# Patient Record
Sex: FEMALE | Race: BLACK OR AFRICAN AMERICAN | Employment: FULL TIME | URBAN - METROPOLITAN AREA
[De-identification: names, ages, dates, MRNs, and addresses within clinical notes are randomized per-mention and may not be internally consistent; named-entity substitution may affect disease eponyms.]

---

## 2017-10-09 ENCOUNTER — OFFICE VISIT (OUTPATIENT)
Dept: OBGYN CLINIC | Facility: CLINIC | Age: 37
End: 2017-10-09

## 2017-10-09 ENCOUNTER — LAB ENCOUNTER (OUTPATIENT)
Dept: LAB | Age: 37
End: 2017-10-09
Attending: OBSTETRICS & GYNECOLOGY
Payer: COMMERCIAL

## 2017-10-09 VITALS
SYSTOLIC BLOOD PRESSURE: 138 MMHG | BODY MASS INDEX: 36.82 KG/M2 | DIASTOLIC BLOOD PRESSURE: 78 MMHG | HEIGHT: 61 IN | HEART RATE: 80 BPM | WEIGHT: 195 LBS

## 2017-10-09 DIAGNOSIS — Z34.81 PRENATAL CARE, SUBSEQUENT PREGNANCY IN FIRST TRIMESTER: Primary | ICD-10-CM

## 2017-10-09 DIAGNOSIS — Z3A.10 10 WEEKS GESTATION OF PREGNANCY: ICD-10-CM

## 2017-10-09 DIAGNOSIS — I10 ESSENTIAL HYPERTENSION: ICD-10-CM

## 2017-10-09 DIAGNOSIS — Z34.81 PRENATAL CARE, SUBSEQUENT PREGNANCY IN FIRST TRIMESTER: ICD-10-CM

## 2017-10-09 DIAGNOSIS — Z34.00 INITIAL OBSTETRIC VISIT, ANTEPARTUM: ICD-10-CM

## 2017-10-09 PROCEDURE — 76801 OB US < 14 WKS SINGLE FETUS: CPT | Performed by: OBSTETRICS & GYNECOLOGY

## 2017-10-09 PROCEDURE — 85025 COMPLETE CBC W/AUTO DIFF WBC: CPT | Performed by: OBSTETRICS & GYNECOLOGY

## 2017-10-09 PROCEDURE — 81003 URINALYSIS AUTO W/O SCOPE: CPT | Performed by: OBSTETRICS & GYNECOLOGY

## 2017-10-09 PROCEDURE — 86900 BLOOD TYPING SEROLOGIC ABO: CPT | Performed by: OBSTETRICS & GYNECOLOGY

## 2017-10-09 PROCEDURE — 86850 RBC ANTIBODY SCREEN: CPT | Performed by: OBSTETRICS & GYNECOLOGY

## 2017-10-09 PROCEDURE — 88175 CYTOPATH C/V AUTO FLUID REDO: CPT | Performed by: OBSTETRICS & GYNECOLOGY

## 2017-10-09 PROCEDURE — 86780 TREPONEMA PALLIDUM: CPT | Performed by: OBSTETRICS & GYNECOLOGY

## 2017-10-09 PROCEDURE — 87340 HEPATITIS B SURFACE AG IA: CPT | Performed by: OBSTETRICS & GYNECOLOGY

## 2017-10-09 PROCEDURE — 86901 BLOOD TYPING SEROLOGIC RH(D): CPT | Performed by: OBSTETRICS & GYNECOLOGY

## 2017-10-09 PROCEDURE — 87086 URINE CULTURE/COLONY COUNT: CPT | Performed by: OBSTETRICS & GYNECOLOGY

## 2017-10-09 PROCEDURE — 86762 RUBELLA ANTIBODY: CPT | Performed by: OBSTETRICS & GYNECOLOGY

## 2017-10-09 PROCEDURE — 87389 HIV-1 AG W/HIV-1&-2 AB AG IA: CPT | Performed by: OBSTETRICS & GYNECOLOGY

## 2017-10-09 NOTE — PROGRESS NOTES
Patient has no complaints, denies h/o HSV, MRSA, blood transfusion  1. HTN  - toprol XL 25 daily  2. H/o postpartum cardiomyopathy 2012  - Arthur Kaye following - had ECHO 2 weeks ago  - MFM consult  3. AMA  - counsyl

## 2017-10-11 ENCOUNTER — TELEPHONE (OUTPATIENT)
Dept: OBGYN CLINIC | Facility: CLINIC | Age: 37
End: 2017-10-11

## 2017-10-11 NOTE — TELEPHONE ENCOUNTER
Per pt she needs the order to go see mfm dr. Arvella Cranker said there is no order in the system. Please advise and call pt.  Thanks

## 2017-10-11 NOTE — TELEPHONE ENCOUNTER
Patient aware that order is placed in the system. She will call back to schedule her appointment with YOANA.

## 2017-10-17 ENCOUNTER — MED REC SCAN ONLY (OUTPATIENT)
Dept: OBGYN CLINIC | Facility: CLINIC | Age: 37
End: 2017-10-17

## 2017-10-27 ENCOUNTER — HOSPITAL ENCOUNTER (EMERGENCY)
Facility: HOSPITAL | Age: 37
Discharge: HOME | End: 2017-10-27
Attending: EMERGENCY MEDICINE
Payer: COMMERCIAL

## 2017-10-27 ENCOUNTER — APPOINTMENT (OUTPATIENT)
Dept: ULTRASOUND IMAGING | Facility: HOSPITAL | Age: 37
End: 2017-10-27
Attending: EMERGENCY MEDICINE
Payer: COMMERCIAL

## 2017-10-27 VITALS
DIASTOLIC BLOOD PRESSURE: 83 MMHG | OXYGEN SATURATION: 99 % | HEIGHT: 61 IN | RESPIRATION RATE: 16 BRPM | SYSTOLIC BLOOD PRESSURE: 140 MMHG | HEART RATE: 106 BPM | TEMPERATURE: 98 F | BODY MASS INDEX: 37 KG/M2 | WEIGHT: 196 LBS

## 2017-10-27 DIAGNOSIS — O41.8X90 SUBCHORIONIC HEMORRHAGE OF PLACENTA, ANTEPARTUM: ICD-10-CM

## 2017-10-27 DIAGNOSIS — O20.0 THREATENED MISCARRIAGE: Primary | ICD-10-CM

## 2017-10-27 DIAGNOSIS — O46.8X9 SUBCHORIONIC HEMORRHAGE OF PLACENTA, ANTEPARTUM: ICD-10-CM

## 2017-10-27 PROCEDURE — 80053 COMPREHEN METABOLIC PANEL: CPT | Performed by: EMERGENCY MEDICINE

## 2017-10-27 PROCEDURE — 96361 HYDRATE IV INFUSION ADD-ON: CPT

## 2017-10-27 PROCEDURE — 86900 BLOOD TYPING SEROLOGIC ABO: CPT | Performed by: EMERGENCY MEDICINE

## 2017-10-27 PROCEDURE — 96360 HYDRATION IV INFUSION INIT: CPT

## 2017-10-27 PROCEDURE — 85025 COMPLETE CBC W/AUTO DIFF WBC: CPT | Performed by: EMERGENCY MEDICINE

## 2017-10-27 PROCEDURE — 81001 URINALYSIS AUTO W/SCOPE: CPT | Performed by: EMERGENCY MEDICINE

## 2017-10-27 PROCEDURE — 84702 CHORIONIC GONADOTROPIN TEST: CPT | Performed by: EMERGENCY MEDICINE

## 2017-10-27 PROCEDURE — 86901 BLOOD TYPING SEROLOGIC RH(D): CPT | Performed by: EMERGENCY MEDICINE

## 2017-10-27 PROCEDURE — 99284 EMERGENCY DEPT VISIT MOD MDM: CPT

## 2017-10-27 PROCEDURE — 87086 URINE CULTURE/COLONY COUNT: CPT | Performed by: EMERGENCY MEDICINE

## 2017-10-27 PROCEDURE — 76801 OB US < 14 WKS SINGLE FETUS: CPT | Performed by: EMERGENCY MEDICINE

## 2017-10-27 NOTE — ED NOTES
Pt left ER feeling well. Will call OB when she gets home to set up follow up. No further bleeding while in ER. No abd pain.  Fiance is with her

## 2017-10-27 NOTE — ED PROVIDER NOTES
Patient Seen in: [facility] Emergency Department    History   Patient presents with:  Eval-G (gynecologic)    Stated Complaint: eval g    HPI    Patient is a 28-year-old  pregnant female who is EGA is approximately 14 weeks, presenting to the e (5' 1\")   Wt 88.9 kg   LMP 07/25/2017 (Approximate)   SpO2 99%   BMI 37.03 kg/m²         Physical Exam    General: Alert and oriented in no distress. Neuro: No facial droop. No slurred speech. Grossly normal and symmetric motor strength and sensation. Neutrophil Absolute 8.76 (*)     Monocyte Absolute 0.80 (*)     Eosinophil Absolute 0.41 (*)     All other components within normal limits   CBC WITH DIFFERENTIAL WITH PLATELET    Narrative:      The following orders were created for panel order CBC WITH DI OVARY:  Measures 3.5 x 2.7 x 3.1 cm. LEFT OVARY:  Measures 4.0 x 2.1 x 2.0 cm. CUL-DE-SAC:  Normal. CLINICAL AGE:  13 weeks 3 days SONOGRAPHIC AGE:  13 weeks 3 days plus or -1 week one day OTHER:  Negative. CONCLUSION:  1.  Single live intrauterine pregnan

## 2017-11-01 NOTE — PROGRESS NOTES
Outpatient Maternal-Fetal Medicine Consultation    Dear Dr. Shilo Duran    Thank you for requesting ultrasound evaluation and maternal fetal medicine consultation on your patient Denice Baker.   As you are aware she is a 30year old female [id number] with a oriented,no acute distress  Abdomen: gravid, soft, non-tender  Extremities: non-tender, no edema  Baseline Preeclampsia Labs    24-hour Urine Protein: NOT YET OBTAINED    Lab Results  Component Value Date/Time   AST 10 (L) 10/27/2017 09:09 AM   ALT 12 (L) a higher rate of:    · Fetal malformations  · Preeclampsia  · Gestational diabetes  · Intrauterine fetal death    As a result, enhanced pregnancy surveillance is advised for these patients including a comprehensive ultrasound to assess for fetal malformati tests are associated with lower detection and higher false positive rates. The patient has already obtained a low-risk  NPIT result and was appropriately reassured.      HISTORY OF PERIPARTUM CARDIOMYOPATHY  History:  She was admitted to [facility] disease prior to the last month of pregnancy, and LV systolic dysfunction (eg, left ventricular ejection fraction [LVEF] below 45 percent or a reduced shortening fraction).               During pregnancy, there is a significant increase in blood volume and history of PPCM are incompletely defined. The available data come from several small case series, which suggest that the risk of complications is high, particularly among women who do not have full recovery of LV function.   There is an increased risk of pr cardiology. Long-standing history of hypertension    Discussion:  We discussed the potential implications associated with chronic hypertension.    I informed the patient that chronic hypertension increases the risk of pre-eclampsia, placental abruption, IU fetal DNA, declined invasive testing  · History of Peripartum Cardiomyopathy: recent echocardiogram revealed NORMAL EF (55-60%)  · Hypertension: on Toprol    RECOMMENDATIONS:  · Continue care with Dr. Malvin Monroy  · Offer pregnancy termination: patient decli

## 2017-11-02 ENCOUNTER — OFFICE VISIT (OUTPATIENT)
Dept: OBGYN CLINIC | Facility: CLINIC | Age: 37
End: 2017-11-02

## 2017-11-02 ENCOUNTER — OFFICE VISIT (OUTPATIENT)
Dept: PERINATAL CARE | Facility: HOSPITAL | Age: 37
End: 2017-11-02
Attending: OBSTETRICS & GYNECOLOGY
Payer: COMMERCIAL

## 2017-11-02 VITALS
DIASTOLIC BLOOD PRESSURE: 84 MMHG | WEIGHT: 193 LBS | SYSTOLIC BLOOD PRESSURE: 144 MMHG | BODY MASS INDEX: 36 KG/M2 | HEART RATE: 99 BPM

## 2017-11-02 VITALS
SYSTOLIC BLOOD PRESSURE: 138 MMHG | HEIGHT: 61 IN | BODY MASS INDEX: 36.44 KG/M2 | DIASTOLIC BLOOD PRESSURE: 80 MMHG | WEIGHT: 193 LBS

## 2017-11-02 DIAGNOSIS — I42.8 OTHER CARDIOMYOPATHY (HCC): ICD-10-CM

## 2017-11-02 DIAGNOSIS — Z3A.13 13 WEEKS GESTATION OF PREGNANCY: ICD-10-CM

## 2017-11-02 DIAGNOSIS — Z34.82 PRENATAL CARE, SUBSEQUENT PREGNANCY IN SECOND TRIMESTER: Primary | ICD-10-CM

## 2017-11-02 DIAGNOSIS — I10 ESSENTIAL HYPERTENSION: ICD-10-CM

## 2017-11-02 DIAGNOSIS — O20.9 VAGINAL BLEEDING BEFORE 22 WEEKS GESTATION: ICD-10-CM

## 2017-11-02 PROCEDURE — 76813 OB US NUCHAL MEAS 1 GEST: CPT | Performed by: OBSTETRICS & GYNECOLOGY

## 2017-11-02 PROCEDURE — 99244 OFF/OP CNSLTJ NEW/EST MOD 40: CPT | Performed by: OBSTETRICS & GYNECOLOGY

## 2017-11-02 NOTE — PROGRESS NOTES
Patient noticed large blood clot 3 days ago and went to ER, u/s showed small subchorionic bleed.  No bleeding since then, no sexual intercourse prior to bleeding  Spec: cervix closed, no blood, no lesions  - reassured  - pelvic rest

## 2017-11-11 ENCOUNTER — OFFICE VISIT (OUTPATIENT)
Dept: OBGYN CLINIC | Facility: CLINIC | Age: 37
End: 2017-11-11

## 2017-11-11 VITALS
HEIGHT: 61 IN | SYSTOLIC BLOOD PRESSURE: 130 MMHG | WEIGHT: 194 LBS | BODY MASS INDEX: 36.63 KG/M2 | DIASTOLIC BLOOD PRESSURE: 70 MMHG

## 2017-11-11 DIAGNOSIS — I10 ESSENTIAL HYPERTENSION: Primary | ICD-10-CM

## 2017-11-11 DIAGNOSIS — O09.522 ELDERLY MULTIGRAVIDA IN SECOND TRIMESTER: ICD-10-CM

## 2017-11-11 RX ORDER — VITAMIN A, VITAMIN C, VITAMIN D-3, VITAMIN E, VITAMIN B-1, VITAMIN B-2, NIACIN, VITAMIN B-6, CALCIUM, IRON, ZINC, COPPER 4000; 120; 400; 22; 1.84; 3; 20; 10; 1; 12; 200; 27; 25; 2 [IU]/1; MG/1; [IU]/1; MG/1; MG/1; MG/1; MG/1; MG/1; MG/1; UG/1; MG/1; MG/1; MG/1; MG/1
1 TABLET ORAL DAILY
Qty: 30 TABLET | Refills: 11 | Status: SHIPPED | OUTPATIENT
Start: 2017-11-11 | End: 2017-12-11

## 2017-11-11 NOTE — PROGRESS NOTES
Ultrasound showed viable pregnancy. Will collect 24 hour urine. She cannot do the blood work here. Desires AFP only. Refuses flu shot. She is to proceed with the pregnancy. EScription given for prenatal vitamins and a blood pressure cuff.

## 2017-11-21 ENCOUNTER — TELEPHONE (OUTPATIENT)
Dept: OBGYN CLINIC | Facility: CLINIC | Age: 37
End: 2017-11-21

## 2017-11-30 ENCOUNTER — OFFICE VISIT (OUTPATIENT)
Dept: OBGYN CLINIC | Facility: CLINIC | Age: 37
End: 2017-11-30

## 2017-11-30 VITALS
BODY MASS INDEX: 36.44 KG/M2 | SYSTOLIC BLOOD PRESSURE: 122 MMHG | WEIGHT: 193 LBS | HEIGHT: 61 IN | DIASTOLIC BLOOD PRESSURE: 60 MMHG

## 2017-11-30 DIAGNOSIS — Z3A.17 17 WEEKS GESTATION OF PREGNANCY: ICD-10-CM

## 2017-11-30 DIAGNOSIS — Z34.82 PRENATAL CARE, SUBSEQUENT PREGNANCY IN SECOND TRIMESTER: Primary | ICD-10-CM

## 2017-11-30 DIAGNOSIS — M54.30 SCIATIC LEG PAIN: ICD-10-CM

## 2017-11-30 NOTE — PROGRESS NOTES
Patient c/o left lower back  pain radiating down her leg for several days without relieve  - referred to PT, discussed sciatica pain, tylenol prn

## 2017-12-05 ENCOUNTER — HOSPITAL ENCOUNTER (OUTPATIENT)
Dept: PHYSICAL THERAPY | Facility: HOSPITAL | Age: 37
Setting detail: THERAPIES SERIES
Discharge: HOME | End: 2017-12-05
Attending: OBSTETRICS & GYNECOLOGY
Payer: COMMERCIAL

## 2017-12-05 DIAGNOSIS — Z3A.17 17 WEEKS GESTATION OF PREGNANCY: ICD-10-CM

## 2017-12-05 DIAGNOSIS — Z34.82 PRENATAL CARE, SUBSEQUENT PREGNANCY IN SECOND TRIMESTER: ICD-10-CM

## 2017-12-05 DIAGNOSIS — M54.30 SCIATIC LEG PAIN: ICD-10-CM

## 2017-12-05 PROCEDURE — 97110 THERAPEUTIC EXERCISES: CPT

## 2017-12-05 PROCEDURE — 97161 PT EVAL LOW COMPLEX 20 MIN: CPT

## 2017-12-05 NOTE — PROGRESS NOTES
SPINE EVALUATION:   Referring Physician: Dr. Suzi Chan  Diagnosis: Sciatic leg pain L     Date of Service: 12/5/2017     PATIENT SUMMARY   Ryan Stroud is a 30year old y/o female who presents to therapy today with complaints of L sciatic/buttock p Today’s Treatment and Response:  Patient education provided on posture, transferring  Patient was instructed in and issued a HEP for clamshell x10, SLR x10, HS 20 sec x3, piriformis stretch 20 sec x3 BID, treadmill 2.0mph x5 min  Charges: PT Eval Low C planning and for this course of care. Thank you for your referral. Please co-sign or sign and return this letter via fax as soon as possible to [phone].  If you have any questions, please contact me at Dept: [phone]    Sincerely,  Electronicall

## 2017-12-07 ENCOUNTER — HOSPITAL ENCOUNTER (OUTPATIENT)
Dept: PHYSICAL THERAPY | Facility: HOSPITAL | Age: 37
Setting detail: THERAPIES SERIES
Discharge: HOME | End: 2017-12-07
Attending: OBSTETRICS & GYNECOLOGY
Payer: COMMERCIAL

## 2017-12-07 PROCEDURE — 97110 THERAPEUTIC EXERCISES: CPT

## 2017-12-07 NOTE — PROGRESS NOTES
Dx: Sciatic leg pain L           Authorized # of Visits:  12         Next MD visit: none scheduled  Fall Risk: standard         Precautions: n/a             Subjective: no c/o back pain    Objective: symmetrical LE      Date: 12/7/2017  Tx#: 2/8 Date:    Tx

## 2017-12-08 ENCOUNTER — APPOINTMENT (OUTPATIENT)
Dept: OBGYN CLINIC | Facility: CLINIC | Age: 37
End: 2017-12-08

## 2017-12-08 DIAGNOSIS — O09.523 AMA (ADVANCED MATERNAL AGE) MULTIGRAVIDA 35+, THIRD TRIMESTER: ICD-10-CM

## 2017-12-08 DIAGNOSIS — O99.212 OBESITY AFFECTING PREGNANCY IN SECOND TRIMESTER: Primary | ICD-10-CM

## 2017-12-08 PROCEDURE — 76805 OB US >/= 14 WKS SNGL FETUS: CPT | Performed by: OBSTETRICS & GYNECOLOGY

## 2017-12-09 ENCOUNTER — OFFICE VISIT (OUTPATIENT)
Dept: OBGYN CLINIC | Facility: CLINIC | Age: 37
End: 2017-12-09

## 2017-12-09 VITALS
DIASTOLIC BLOOD PRESSURE: 62 MMHG | BODY MASS INDEX: 36.44 KG/M2 | WEIGHT: 193 LBS | HEIGHT: 61 IN | SYSTOLIC BLOOD PRESSURE: 112 MMHG

## 2017-12-09 DIAGNOSIS — Z34.82 PRENATAL CARE, SUBSEQUENT PREGNANCY IN SECOND TRIMESTER: Primary | ICD-10-CM

## 2017-12-09 DIAGNOSIS — Z3A.22 22 WEEKS GESTATION OF PREGNANCY: ICD-10-CM

## 2017-12-09 NOTE — PROGRESS NOTES
Patient started PT for lower back pain  - 20 wk u/s normal   1. H/o Peripartum cardiomyopothy  - seeing cardiologyst  - ECHO next week  2.  HTN  - cont toprol  - patient has not started ASA 81 yet, promised to get it today and take daily  - NST at 32 weeks

## 2017-12-10 ENCOUNTER — APPOINTMENT (OUTPATIENT)
Dept: LAB | Facility: HOSPITAL | Age: 37
End: 2017-12-10
Attending: OBSTETRICS & GYNECOLOGY
Payer: COMMERCIAL

## 2017-12-10 DIAGNOSIS — I10 ESSENTIAL HYPERTENSION: ICD-10-CM

## 2017-12-10 PROCEDURE — 84156 ASSAY OF PROTEIN URINE: CPT

## 2017-12-12 ENCOUNTER — APPOINTMENT (OUTPATIENT)
Dept: PHYSICAL THERAPY | Facility: HOSPITAL | Age: 37
End: 2017-12-12
Attending: OBSTETRICS & GYNECOLOGY
Payer: COMMERCIAL

## 2017-12-13 ENCOUNTER — APPOINTMENT (OUTPATIENT)
Dept: PHYSICAL THERAPY | Facility: HOSPITAL | Age: 37
End: 2017-12-13
Attending: OBSTETRICS & GYNECOLOGY
Payer: COMMERCIAL

## 2017-12-13 ENCOUNTER — APPOINTMENT (OUTPATIENT)
Dept: LAB | Age: 37
End: 2017-12-13
Payer: COMMERCIAL

## 2017-12-13 DIAGNOSIS — O09.522 ELDERLY MULTIGRAVIDA IN SECOND TRIMESTER: ICD-10-CM

## 2017-12-13 PROCEDURE — 82105 ALPHA-FETOPROTEIN SERUM: CPT

## 2017-12-14 ENCOUNTER — APPOINTMENT (OUTPATIENT)
Dept: PHYSICAL THERAPY | Facility: HOSPITAL | Age: 37
End: 2017-12-14
Attending: OBSTETRICS & GYNECOLOGY
Payer: COMMERCIAL

## 2017-12-19 ENCOUNTER — APPOINTMENT (OUTPATIENT)
Dept: PHYSICAL THERAPY | Facility: HOSPITAL | Age: 37
End: 2017-12-19
Attending: OBSTETRICS & GYNECOLOGY
Payer: COMMERCIAL

## 2017-12-19 ENCOUNTER — HOSPITAL ENCOUNTER (OUTPATIENT)
Dept: PHYSICAL THERAPY | Facility: HOSPITAL | Age: 37
Setting detail: THERAPIES SERIES
Discharge: HOME | End: 2017-12-19
Attending: OBSTETRICS & GYNECOLOGY
Payer: COMMERCIAL

## 2017-12-19 PROCEDURE — 97110 THERAPEUTIC EXERCISES: CPT

## 2017-12-19 NOTE — PROGRESS NOTES
Dx: Sciatic leg pain L           Authorized # of Visits:  12         Next MD visit: none scheduled  Fall Risk: standard         Precautions: n/a             Subjective: no c/o back pain    Objective: symmetrical LE      Date: 12/7/2017  Tx#: 2/8 Date: 12/1

## 2017-12-21 ENCOUNTER — APPOINTMENT (OUTPATIENT)
Dept: PHYSICAL THERAPY | Facility: HOSPITAL | Age: 37
End: 2017-12-21
Attending: OBSTETRICS & GYNECOLOGY
Payer: COMMERCIAL

## 2017-12-26 ENCOUNTER — APPOINTMENT (OUTPATIENT)
Dept: PHYSICAL THERAPY | Facility: HOSPITAL | Age: 37
End: 2017-12-26
Attending: OBSTETRICS & GYNECOLOGY
Payer: COMMERCIAL

## 2017-12-26 ENCOUNTER — HOSPITAL ENCOUNTER (OUTPATIENT)
Dept: PHYSICAL THERAPY | Facility: HOSPITAL | Age: 37
Setting detail: THERAPIES SERIES
Discharge: HOME | End: 2017-12-26
Attending: OBSTETRICS & GYNECOLOGY
Payer: COMMERCIAL

## 2017-12-26 PROCEDURE — 97110 THERAPEUTIC EXERCISES: CPT

## 2017-12-28 ENCOUNTER — APPOINTMENT (OUTPATIENT)
Dept: PHYSICAL THERAPY | Facility: HOSPITAL | Age: 37
End: 2017-12-28
Attending: OBSTETRICS & GYNECOLOGY
Payer: COMMERCIAL

## 2017-12-28 ENCOUNTER — HOSPITAL ENCOUNTER (OUTPATIENT)
Dept: PHYSICAL THERAPY | Facility: HOSPITAL | Age: 37
Setting detail: THERAPIES SERIES
Discharge: HOME | End: 2017-12-28
Attending: OBSTETRICS & GYNECOLOGY
Payer: COMMERCIAL

## 2017-12-28 PROCEDURE — 97110 THERAPEUTIC EXERCISES: CPT

## 2017-12-28 NOTE — PROGRESS NOTES
Discharge Summary    Pt has attended 5, cancelled 0, and no shown 0 visits in Physical Therapy. Dx: Sciatic leg pain L                       Subjective: No c/o back pain or difficulty with housework anymore.  Able to walk after sitting for period of mechanics to decrease pain and improve spinal safety (8 visits)-MET  · Pt will report improved symptom centralization and absence of radicular symptoms for 3 consecutive days to improve function with ADL (8 visits)  · Pt will have decreased L sciatic neura dune x20 wo UE [employer] x20 wo UE [employer] x20 wo UE   x20   Lunge BOSU x20 w UE Lunge BOSU x20 wo UE Lunge BOSU x20 wo UE   x20   - airex  March x20 airex  March x20   x20   -  ASU 6 inch x15  wo UE  ASU 6 inch x15  wo UE   x20    Side stepovers 6 inch

## 2017-12-28 NOTE — ADDENDUM NOTE
Encounter addended by: Camilo Ortiz PT on: 12/28/2017 10:16 AM<BR>    Actions taken: Sign clinical note

## 2018-01-08 ENCOUNTER — OFFICE VISIT (OUTPATIENT)
Dept: OBGYN CLINIC | Facility: CLINIC | Age: 38
End: 2018-01-08

## 2018-01-08 VITALS
SYSTOLIC BLOOD PRESSURE: 104 MMHG | DIASTOLIC BLOOD PRESSURE: 58 MMHG | BODY MASS INDEX: 35.87 KG/M2 | HEIGHT: 61 IN | WEIGHT: 190 LBS

## 2018-01-08 DIAGNOSIS — I10 ESSENTIAL HYPERTENSION: ICD-10-CM

## 2018-01-08 DIAGNOSIS — O09.522 AMA (ADVANCED MATERNAL AGE) MULTIGRAVIDA 35+, SECOND TRIMESTER: ICD-10-CM

## 2018-01-08 DIAGNOSIS — Z3A.23 23 WEEKS GESTATION OF PREGNANCY: ICD-10-CM

## 2018-01-08 DIAGNOSIS — Z34.82 PRENATAL CARE, SUBSEQUENT PREGNANCY IN SECOND TRIMESTER: Primary | ICD-10-CM

## 2018-01-08 PROCEDURE — 99212 OFFICE O/P EST SF 10 MIN: CPT | Performed by: OBSTETRICS & GYNECOLOGY

## 2018-01-08 NOTE — PROGRESS NOTES
No complaints, completed PT for lower back pain - feels much better  1. H/o peripartum cardiomyopoty  - ECHO nl 12/17, will repeat 2/18  2. HTN  - controlled with toprol  - 24 h urine - unable to calculate proterin - less then 5  - NST at 32 weeks  - u/s q

## 2018-01-19 ENCOUNTER — LAB ENCOUNTER (OUTPATIENT)
Dept: LAB | Age: 38
End: 2018-01-19
Attending: OBSTETRICS & GYNECOLOGY
Payer: MEDICAID

## 2018-01-19 DIAGNOSIS — Z3A.25 25 WEEKS GESTATION OF PREGNANCY: ICD-10-CM

## 2018-01-19 DIAGNOSIS — Z3A.25 25 WEEKS GESTATION OF PREGNANCY: Primary | ICD-10-CM

## 2018-01-19 LAB
BASOPHILS # BLD AUTO: 0.05 X10(3) UL (ref 0–0.1)
BASOPHILS NFR BLD AUTO: 0.5 %
EOSINOPHIL # BLD AUTO: 0.2 X10(3) UL (ref 0–0.3)
EOSINOPHIL NFR BLD AUTO: 1.9 %
ERYTHROCYTE [DISTWIDTH] IN BLOOD BY AUTOMATED COUNT: 14.9 % (ref 11.5–16)
GLUCOSE 1H P GLC SERPL-MCNC: 154 MG/DL
HCT VFR BLD AUTO: 33.2 % (ref 34–50)
HGB BLD-MCNC: 10.5 G/DL (ref 12–16)
IMMATURE GRANULOCYTE COUNT: 0.06 X10(3) UL (ref 0–1)
IMMATURE GRANULOCYTE RATIO %: 0.6 %
LYMPHOCYTES # BLD AUTO: 1.78 X10(3) UL (ref 0.9–4)
LYMPHOCYTES NFR BLD AUTO: 16.6 %
MCH RBC QN AUTO: 27.4 PG (ref 27–33.2)
MCHC RBC AUTO-ENTMCNC: 31.6 G/DL (ref 31–37)
MCV RBC AUTO: 86.7 FL (ref 81–100)
MONOCYTES # BLD AUTO: 0.73 X10(3) UL (ref 0.1–0.6)
MONOCYTES NFR BLD AUTO: 6.8 %
NEUTROPHIL ABS PRELIM: 7.91 X10 (3) UL (ref 1.3–6.7)
NEUTROPHILS # BLD AUTO: 7.91 X10(3) UL (ref 1.3–6.7)
NEUTROPHILS NFR BLD AUTO: 73.6 %
PLATELET # BLD AUTO: 335 10(3)UL (ref 150–450)
RBC # BLD AUTO: 3.83 X10(6)UL (ref 3.8–5.1)
RED CELL DISTRIBUTION WIDTH-SD: 46.8 FL (ref 35.1–46.3)
WBC # BLD AUTO: 10.7 X10(3) UL (ref 4–13)

## 2018-01-19 PROCEDURE — 85025 COMPLETE CBC W/AUTO DIFF WBC: CPT

## 2018-01-19 PROCEDURE — 82950 GLUCOSE TEST: CPT

## 2018-01-23 DIAGNOSIS — R73.09 ABNORMAL GLUCOSE TOLERANCE TEST: Primary | ICD-10-CM

## 2018-01-25 ENCOUNTER — LAB ENCOUNTER (OUTPATIENT)
Dept: LAB | Age: 38
End: 2018-01-25
Attending: OBSTETRICS & GYNECOLOGY

## 2018-01-25 DIAGNOSIS — R73.09 ABNORMAL GLUCOSE TOLERANCE TEST: ICD-10-CM

## 2018-01-25 LAB
1 HR GLUCOSE GESTATIONAL: 158 MG/DL
2 HR GLUCOSE GESTATIONAL: 153 MG/DL
3 HR GLUCOSE GESTATIONAL: 148 MG/DL
FASTING GLUCOSE GESTATIONAL: 75 MG/DL

## 2018-01-25 PROCEDURE — 82952 GTT-ADDED SAMPLES: CPT

## 2018-01-25 PROCEDURE — 82951 GLUCOSE TOLERANCE TEST (GTT): CPT

## 2018-02-08 DIAGNOSIS — O09.523 ELDERLY MULTIGRAVIDA IN THIRD TRIMESTER: Primary | ICD-10-CM

## 2018-02-08 DIAGNOSIS — I42.8 OTHER CARDIOMYOPATHY (HCC): ICD-10-CM

## 2018-02-09 ENCOUNTER — APPOINTMENT (OUTPATIENT)
Dept: OBGYN CLINIC | Facility: CLINIC | Age: 38
End: 2018-02-09

## 2018-02-09 ENCOUNTER — OFFICE VISIT (OUTPATIENT)
Dept: OBGYN CLINIC | Facility: CLINIC | Age: 38
End: 2018-02-09

## 2018-02-09 VITALS
SYSTOLIC BLOOD PRESSURE: 120 MMHG | WEIGHT: 188 LBS | BODY MASS INDEX: 35.5 KG/M2 | HEIGHT: 61 IN | DIASTOLIC BLOOD PRESSURE: 70 MMHG

## 2018-02-09 DIAGNOSIS — Z3A.28 28 WEEKS GESTATION OF PREGNANCY: ICD-10-CM

## 2018-02-09 DIAGNOSIS — Z34.83 PRENATAL CARE, SUBSEQUENT PREGNANCY IN THIRD TRIMESTER: Primary | ICD-10-CM

## 2018-02-09 PROCEDURE — 0502F SUBSEQUENT PRENATAL CARE: CPT | Performed by: OBSTETRICS & GYNECOLOGY

## 2018-02-23 ENCOUNTER — OFFICE VISIT (OUTPATIENT)
Dept: OBGYN CLINIC | Facility: CLINIC | Age: 38
End: 2018-02-23

## 2018-02-23 VITALS
HEIGHT: 61 IN | DIASTOLIC BLOOD PRESSURE: 68 MMHG | SYSTOLIC BLOOD PRESSURE: 124 MMHG | BODY MASS INDEX: 35.87 KG/M2 | WEIGHT: 190 LBS

## 2018-02-23 DIAGNOSIS — Z3A.30 30 WEEKS GESTATION OF PREGNANCY: ICD-10-CM

## 2018-02-23 DIAGNOSIS — O09.523 AMA (ADVANCED MATERNAL AGE) MULTIGRAVIDA 35+, THIRD TRIMESTER: ICD-10-CM

## 2018-02-23 DIAGNOSIS — I10 ESSENTIAL HYPERTENSION: ICD-10-CM

## 2018-02-23 DIAGNOSIS — Z34.83 PRENATAL CARE, SUBSEQUENT PREGNANCY IN THIRD TRIMESTER: Primary | ICD-10-CM

## 2018-02-23 PROCEDURE — 0502F SUBSEQUENT PRENATAL CARE: CPT | Performed by: OBSTETRICS & GYNECOLOGY

## 2018-02-23 NOTE — PROGRESS NOTES
No complaints BOY  - HIV next visit  1.h/o paripartum caediomyopoty  - ECHO 3/18  2. HTN  - toprol  - ASA 81  - 24 urine next visit  - NST at 32 weeks  - us at 32 weeks  3. [place]

## 2018-03-09 ENCOUNTER — OFFICE VISIT (OUTPATIENT)
Dept: OBGYN CLINIC | Facility: CLINIC | Age: 38
End: 2018-03-09

## 2018-03-09 ENCOUNTER — APPOINTMENT (OUTPATIENT)
Dept: OBGYN CLINIC | Facility: CLINIC | Age: 38
End: 2018-03-09

## 2018-03-09 VITALS
BODY MASS INDEX: 35.5 KG/M2 | DIASTOLIC BLOOD PRESSURE: 64 MMHG | HEIGHT: 61 IN | WEIGHT: 188 LBS | SYSTOLIC BLOOD PRESSURE: 122 MMHG

## 2018-03-09 DIAGNOSIS — O09.523 AMA (ADVANCED MATERNAL AGE) MULTIGRAVIDA 35+, THIRD TRIMESTER: ICD-10-CM

## 2018-03-09 DIAGNOSIS — I10 ESSENTIAL HYPERTENSION: ICD-10-CM

## 2018-03-09 DIAGNOSIS — Z34.83 PRENATAL CARE, SUBSEQUENT PREGNANCY IN THIRD TRIMESTER: ICD-10-CM

## 2018-03-09 PROCEDURE — 76816 OB US FOLLOW-UP PER FETUS: CPT | Performed by: OBSTETRICS & GYNECOLOGY

## 2018-03-09 PROCEDURE — 0502F SUBSEQUENT PRENATAL CARE: CPT | Performed by: OBSTETRICS & GYNECOLOGY

## 2018-03-09 NOTE — PROGRESS NOTES
She has a cardiology appointment this afternoon. Ultrasound for growth shows 32 weeks and 0 days. Heart rate 144 size equals dates PARVEEN is 12.4 vertex presentation nonstress will be done.

## 2018-03-10 ENCOUNTER — NURSE ONLY (OUTPATIENT)
Dept: OBGYN CLINIC | Facility: CLINIC | Age: 38
End: 2018-03-10

## 2018-03-10 ENCOUNTER — HOSPITAL ENCOUNTER (OUTPATIENT)
Facility: HOSPITAL | Age: 38
Setting detail: OBSERVATION
Discharge: HOME | End: 2018-03-10
Attending: OBSTETRICS & GYNECOLOGY | Admitting: OBSTETRICS & GYNECOLOGY
Payer: MEDICAID

## 2018-03-10 VITALS
TEMPERATURE: 97 F | SYSTOLIC BLOOD PRESSURE: 124 MMHG | DIASTOLIC BLOOD PRESSURE: 79 MMHG | HEART RATE: 99 BPM | RESPIRATION RATE: 18 BRPM

## 2018-03-10 PROBLEM — Z34.90 PREGNANCY (HCC): Status: ACTIVE | Noted: 2018-03-10

## 2018-03-10 PROBLEM — Z34.90 PREGNANCY: Status: ACTIVE | Noted: 2018-03-10

## 2018-03-10 LAB
BILIRUBIN URINE: NEGATIVE
CONTROL RUN WITHIN 24 HOURS?: YES
GLUCOSE URINE: NEGATIVE
KETONE URINE: NEGATIVE
NITRITE URINE: NEGATIVE
PH URINE: 6.5 (ref 5–8)
PROTEIN URINE: NEGATIVE
SPEC GRAVITY: 1.02 (ref 1–1.03)
URINE CLARITY: CLEAR
URINE COLOR: YELLOW
UROBILINOGEN URINE: 0.2

## 2018-03-10 PROCEDURE — 59025 FETAL NON-STRESS TEST: CPT | Performed by: OBSTETRICS & GYNECOLOGY

## 2018-03-10 NOTE — PROGRESS NOTES
, 32.1 week IUP admitted to triage for non reactive NST in office. POC discussed and agreed. EFM tested and applied. Oriented to room, staff and call system.

## 2018-03-10 NOTE — PROGRESS NOTES
Dr Park Winston called in, reviewed efm strip with d/c order rcd. D/C instructions given to pt with understanding verbalized and ambulated off unit in stable cond.

## 2018-03-16 ENCOUNTER — OFFICE VISIT (OUTPATIENT)
Dept: OBGYN CLINIC | Facility: CLINIC | Age: 38
End: 2018-03-16

## 2018-03-16 VITALS
HEIGHT: 61 IN | SYSTOLIC BLOOD PRESSURE: 120 MMHG | BODY MASS INDEX: 35.87 KG/M2 | WEIGHT: 190 LBS | DIASTOLIC BLOOD PRESSURE: 68 MMHG

## 2018-03-16 DIAGNOSIS — I10 ESSENTIAL HYPERTENSION: ICD-10-CM

## 2018-03-16 DIAGNOSIS — O09.523 AMA (ADVANCED MATERNAL AGE) MULTIGRAVIDA 35+, THIRD TRIMESTER: ICD-10-CM

## 2018-03-16 DIAGNOSIS — Z3A.33 33 WEEKS GESTATION OF PREGNANCY: ICD-10-CM

## 2018-03-16 DIAGNOSIS — Z34.83 PRENATAL CARE, SUBSEQUENT PREGNANCY IN THIRD TRIMESTER: Primary | ICD-10-CM

## 2018-03-16 PROCEDURE — 0502F SUBSEQUENT PRENATAL CARE: CPT | Performed by: OBSTETRICS & GYNECOLOGY

## 2018-03-16 PROCEDURE — 59025 FETAL NON-STRESS TEST: CPT | Performed by: OBSTETRICS & GYNECOLOGY

## 2018-03-16 NOTE — PROGRESS NOTES
Patient has no complaints  1. H/o cardiomyopathy  - ECHO last week, worsening  of EF 45-50% from 55%  - follow up with MFM  2. HTN  - continue toprol  - 24 hour urine  - NST reactive  3. [place]

## 2018-03-18 ENCOUNTER — APPOINTMENT (OUTPATIENT)
Dept: LAB | Facility: HOSPITAL | Age: 38
End: 2018-03-18
Attending: OBSTETRICS & GYNECOLOGY
Payer: MEDICAID

## 2018-03-18 DIAGNOSIS — Z34.83 PRENATAL CARE, SUBSEQUENT PREGNANCY IN THIRD TRIMESTER: ICD-10-CM

## 2018-03-18 DIAGNOSIS — I10 ESSENTIAL HYPERTENSION: ICD-10-CM

## 2018-03-18 LAB — SPECIMEN VOL UR: 1100 ML

## 2018-03-18 PROCEDURE — 84156 ASSAY OF PROTEIN URINE: CPT

## 2018-03-23 NOTE — PROGRESS NOTES
Received fax Dr. Richard Jerry: Recommendations to continue w/outlined management plan per Dr. Pamela Lowry, All findings normal. Sent for scanning.

## 2018-03-24 ENCOUNTER — TELEPHONE (OUTPATIENT)
Dept: OBGYN CLINIC | Facility: CLINIC | Age: 38
End: 2018-03-24

## 2018-03-24 ENCOUNTER — OFFICE VISIT (OUTPATIENT)
Dept: OBGYN CLINIC | Facility: CLINIC | Age: 38
End: 2018-03-24

## 2018-03-24 VITALS
BODY MASS INDEX: 35.5 KG/M2 | DIASTOLIC BLOOD PRESSURE: 72 MMHG | WEIGHT: 188 LBS | SYSTOLIC BLOOD PRESSURE: 126 MMHG | HEIGHT: 61 IN

## 2018-03-24 DIAGNOSIS — O09.523 AMA (ADVANCED MATERNAL AGE) MULTIGRAVIDA 35+, THIRD TRIMESTER: Primary | ICD-10-CM

## 2018-03-24 PROCEDURE — 59025 FETAL NON-STRESS TEST: CPT | Performed by: OBSTETRICS & GYNECOLOGY

## 2018-03-24 PROCEDURE — 0502F SUBSEQUENT PRENATAL CARE: CPT | Performed by: OBSTETRICS & GYNECOLOGY

## 2018-03-24 RX ORDER — PNV,CALCIUM 72/IRON/FOLIC ACID 27 MG-1 MG
TABLET ORAL
Refills: 11 | COMMUNITY
Start: 2018-03-07 | End: 2021-10-14

## 2018-03-24 RX ORDER — FERROUS SULFATE 325(65) MG
TABLET ORAL
Refills: 0 | COMMUNITY
Start: 2018-03-14 | End: 2021-10-14

## 2018-03-24 NOTE — PROGRESS NOTES
Had an ultrasound yesterday with Maternal-Fetal Medicine results are not available yet denies swelling babies moving. NST is done and is reactive. Cardiologist was seen March 1.

## 2018-03-26 NOTE — TELEPHONE ENCOUNTER
Scheduled.     Future Appointments  Date Time Provider Yfn Torres   3/30/2018 10:00 AM Sandy Llamas MD [facility] Janeen Heller   4/16/2018 10:40 AM SP ECHO S MYRON [facility]   4/20/2018 10:40 AM Abel Gotti MD [facility]

## 2018-03-30 ENCOUNTER — OFFICE VISIT (OUTPATIENT)
Dept: OBGYN CLINIC | Facility: CLINIC | Age: 38
End: 2018-03-30

## 2018-03-30 VITALS
SYSTOLIC BLOOD PRESSURE: 122 MMHG | HEIGHT: 61 IN | DIASTOLIC BLOOD PRESSURE: 72 MMHG | WEIGHT: 190 LBS | BODY MASS INDEX: 35.87 KG/M2

## 2018-03-30 DIAGNOSIS — I10 ESSENTIAL HYPERTENSION: ICD-10-CM

## 2018-03-30 DIAGNOSIS — Z34.83 PRENATAL CARE, SUBSEQUENT PREGNANCY IN THIRD TRIMESTER: ICD-10-CM

## 2018-03-30 DIAGNOSIS — O09.523 AMA (ADVANCED MATERNAL AGE) MULTIGRAVIDA 35+, THIRD TRIMESTER: ICD-10-CM

## 2018-03-30 DIAGNOSIS — O10.913 CHRONIC HYPERTENSION COMPLICATING OR REASON FOR CARE DURING PREGNANCY, THIRD TRIMESTER: Primary | ICD-10-CM

## 2018-03-30 PROCEDURE — 59025 FETAL NON-STRESS TEST: CPT | Performed by: OBSTETRICS & GYNECOLOGY

## 2018-03-30 PROCEDURE — 0502F SUBSEQUENT PRENATAL CARE: CPT | Performed by: OBSTETRICS & GYNECOLOGY

## 2018-03-30 PROCEDURE — 87081 CULTURE SCREEN ONLY: CPT | Performed by: OBSTETRICS & GYNECOLOGY

## 2018-03-30 PROCEDURE — 87653 STREP B DNA AMP PROBE: CPT | Performed by: OBSTETRICS & GYNECOLOGY

## 2018-04-06 ENCOUNTER — APPOINTMENT (OUTPATIENT)
Dept: OBGYN CLINIC | Facility: CLINIC | Age: 38
End: 2018-04-06

## 2018-04-06 ENCOUNTER — TELEPHONE (OUTPATIENT)
Dept: OBGYN CLINIC | Facility: CLINIC | Age: 38
End: 2018-04-06

## 2018-04-06 ENCOUNTER — OFFICE VISIT (OUTPATIENT)
Dept: OBGYN CLINIC | Facility: CLINIC | Age: 38
End: 2018-04-06

## 2018-04-06 ENCOUNTER — APPOINTMENT (OUTPATIENT)
Dept: LAB | Age: 38
End: 2018-04-06
Attending: OBSTETRICS & GYNECOLOGY
Payer: MEDICARE

## 2018-04-06 VITALS
DIASTOLIC BLOOD PRESSURE: 60 MMHG | BODY MASS INDEX: 35.68 KG/M2 | HEIGHT: 61 IN | WEIGHT: 189 LBS | SYSTOLIC BLOOD PRESSURE: 110 MMHG

## 2018-04-06 DIAGNOSIS — O10.913 CHRONIC HYPERTENSION COMPLICATING OR REASON FOR CARE DURING PREGNANCY, THIRD TRIMESTER: ICD-10-CM

## 2018-04-06 DIAGNOSIS — Z3A.30 30 WEEKS GESTATION OF PREGNANCY: ICD-10-CM

## 2018-04-06 DIAGNOSIS — Z3A.36 36 WEEKS GESTATION OF PREGNANCY: ICD-10-CM

## 2018-04-06 DIAGNOSIS — I10 ESSENTIAL HYPERTENSION: ICD-10-CM

## 2018-04-06 DIAGNOSIS — Z34.83 PRENATAL CARE, SUBSEQUENT PREGNANCY IN THIRD TRIMESTER: Primary | ICD-10-CM

## 2018-04-06 DIAGNOSIS — O09.523 AMA (ADVANCED MATERNAL AGE) MULTIGRAVIDA 35+, THIRD TRIMESTER: ICD-10-CM

## 2018-04-06 DIAGNOSIS — Z34.83 PRENATAL CARE, SUBSEQUENT PREGNANCY IN THIRD TRIMESTER: ICD-10-CM

## 2018-04-06 PROCEDURE — 87389 HIV-1 AG W/HIV-1&-2 AB AG IA: CPT | Performed by: OBSTETRICS & GYNECOLOGY

## 2018-04-06 PROCEDURE — 0502F SUBSEQUENT PRENATAL CARE: CPT | Performed by: OBSTETRICS & GYNECOLOGY

## 2018-04-06 PROCEDURE — 76815 OB US LIMITED FETUS(S): CPT | Performed by: OBSTETRICS & GYNECOLOGY

## 2018-04-06 PROCEDURE — 59025 FETAL NON-STRESS TEST: CPT | Performed by: OBSTETRICS & GYNECOLOGY

## 2018-04-09 ENCOUNTER — APPOINTMENT (OUTPATIENT)
Dept: OBGYN CLINIC | Facility: HOSPITAL | Age: 38
End: 2018-04-09
Payer: MEDICARE

## 2018-04-09 ENCOUNTER — HOSPITAL ENCOUNTER (OUTPATIENT)
Facility: HOSPITAL | Age: 38
Setting detail: OBSERVATION
Discharge: HOME | End: 2018-04-09
Attending: OBSTETRICS & GYNECOLOGY | Admitting: OBSTETRICS & GYNECOLOGY
Payer: MEDICARE

## 2018-04-09 VITALS
TEMPERATURE: 98 F | HEART RATE: 88 BPM | SYSTOLIC BLOOD PRESSURE: 125 MMHG | RESPIRATION RATE: 18 BRPM | DIASTOLIC BLOOD PRESSURE: 77 MMHG

## 2018-04-09 PROCEDURE — 59025 FETAL NON-STRESS TEST: CPT | Performed by: OBSTETRICS & GYNECOLOGY

## 2018-04-09 NOTE — NST
Nonstress Test   Patient: Herminia White    Gestation: 36w3d    NST:       Variability: Moderate           Accelerations: Yes           Decelerations: None            Baseline: 145 BPM           Uterine Irritability: No           Contractions: Not prese

## 2018-04-09 NOTE — PROGRESS NOTES
Pt is a 30year old female admitted to 111/111-A. Patient presents with:  External Version: her for external version upon ultrasound scan per Dr. Alfredito zhong, NST while here     Pt is [id number] 36w3d intra-uterine pregnancy.   History obtained, consent

## 2018-04-12 ENCOUNTER — TELEPHONE (OUTPATIENT)
Dept: OBGYN UNIT | Facility: HOSPITAL | Age: 38
End: 2018-04-12

## 2018-04-13 ENCOUNTER — SURGERY (OUTPATIENT)
Age: 38
End: 2018-04-13

## 2018-04-13 ENCOUNTER — ANESTHESIA (OUTPATIENT)
Dept: OBGYN UNIT | Facility: HOSPITAL | Age: 38
End: 2018-04-13

## 2018-04-13 ENCOUNTER — APPOINTMENT (OUTPATIENT)
Dept: OBGYN CLINIC | Facility: HOSPITAL | Age: 38
End: 2018-04-13
Payer: MEDICAID

## 2018-04-13 ENCOUNTER — HOSPITAL ENCOUNTER (INPATIENT)
Facility: HOSPITAL | Age: 38
LOS: 3 days | Discharge: HOME | End: 2018-04-16
Attending: OBSTETRICS & GYNECOLOGY | Admitting: OBSTETRICS & GYNECOLOGY
Payer: MEDICAID

## 2018-04-13 ENCOUNTER — ANESTHESIA EVENT (OUTPATIENT)
Dept: OBGYN UNIT | Facility: HOSPITAL | Age: 38
End: 2018-04-13

## 2018-04-13 PROBLEM — Z98.891 S/P C-SECTION: Status: ACTIVE | Noted: 2018-04-13

## 2018-04-13 PROCEDURE — 3E033VJ INTRODUCTION OF OTHER HORMONE INTO PERIPHERAL VEIN, PERCUTANEOUS APPROACH: ICD-10-PCS | Performed by: OBSTETRICS & GYNECOLOGY

## 2018-04-13 PROCEDURE — 59514 CESAREAN DELIVERY ONLY: CPT | Performed by: OBSTETRICS & GYNECOLOGY

## 2018-04-13 RX ORDER — DIPHENHYDRAMINE HYDROCHLORIDE 50 MG/ML
25 INJECTION INTRAMUSCULAR; INTRAVENOUS ONCE AS NEEDED
Status: DISCONTINUED | OUTPATIENT
Start: 2018-04-13 | End: 2018-04-13 | Stop reason: HOSPADM

## 2018-04-13 RX ORDER — BISACODYL 10 MG
10 SUPPOSITORY, RECTAL RECTAL
Status: DISCONTINUED | OUTPATIENT
Start: 2018-04-13 | End: 2018-04-16

## 2018-04-13 RX ORDER — NALBUPHINE HCL 10 MG/ML
2.5 AMPUL (ML) INJECTION
Status: DISCONTINUED | OUTPATIENT
Start: 2018-04-13 | End: 2018-04-13

## 2018-04-13 RX ORDER — HYDRALAZINE HYDROCHLORIDE 20 MG/ML
10 INJECTION INTRAMUSCULAR; INTRAVENOUS ONCE AS NEEDED
Status: DISCONTINUED | OUTPATIENT
Start: 2018-04-13 | End: 2018-04-13 | Stop reason: HOSPADM

## 2018-04-13 RX ORDER — ONDANSETRON 2 MG/ML
4 INJECTION INTRAMUSCULAR; INTRAVENOUS EVERY 6 HOURS PRN
Status: DISCONTINUED | OUTPATIENT
Start: 2018-04-13 | End: 2018-04-16

## 2018-04-13 RX ORDER — IBUPROFEN 600 MG/1
600 TABLET ORAL EVERY 6 HOURS SCHEDULED
Status: DISCONTINUED | OUTPATIENT
Start: 2018-04-14 | End: 2018-04-16

## 2018-04-13 RX ORDER — METOPROLOL SUCCINATE 25 MG/1
25 TABLET, EXTENDED RELEASE ORAL
Status: DISCONTINUED | OUTPATIENT
Start: 2018-04-14 | End: 2018-04-16

## 2018-04-13 RX ORDER — HYDROCODONE BITARTRATE AND ACETAMINOPHEN 10; 325 MG/1; MG/1
1 TABLET ORAL EVERY 4 HOURS PRN
Status: DISCONTINUED | OUTPATIENT
Start: 2018-04-13 | End: 2018-04-16

## 2018-04-13 RX ORDER — SODIUM CHLORIDE, SODIUM LACTATE, POTASSIUM CHLORIDE, CALCIUM CHLORIDE 600; 310; 30; 20 MG/100ML; MG/100ML; MG/100ML; MG/100ML
INJECTION, SOLUTION INTRAVENOUS CONTINUOUS
Status: DISCONTINUED | OUTPATIENT
Start: 2018-04-13 | End: 2018-04-13 | Stop reason: HOSPADM

## 2018-04-13 RX ORDER — ONDANSETRON 2 MG/ML
4 INJECTION INTRAMUSCULAR; INTRAVENOUS ONCE AS NEEDED
Status: DISCONTINUED | OUTPATIENT
Start: 2018-04-13 | End: 2018-04-13 | Stop reason: HOSPADM

## 2018-04-13 RX ORDER — NALBUPHINE HCL 10 MG/ML
2.5 AMPUL (ML) INJECTION EVERY 4 HOURS PRN
Status: DISCONTINUED | OUTPATIENT
Start: 2018-04-13 | End: 2018-04-16

## 2018-04-13 RX ORDER — HYDROCODONE BITARTRATE AND ACETAMINOPHEN 5; 325 MG/1; MG/1
1 TABLET ORAL EVERY 4 HOURS PRN
Status: DISCONTINUED | OUTPATIENT
Start: 2018-04-13 | End: 2018-04-16

## 2018-04-13 RX ORDER — LABETALOL HYDROCHLORIDE 5 MG/ML
40 INJECTION, SOLUTION INTRAVENOUS ONCE AS NEEDED
Status: DISCONTINUED | OUTPATIENT
Start: 2018-04-13 | End: 2018-04-13 | Stop reason: HOSPADM

## 2018-04-13 RX ORDER — SIMETHICONE 80 MG
80 TABLET,CHEWABLE ORAL 3 TIMES DAILY PRN
Status: DISCONTINUED | OUTPATIENT
Start: 2018-04-13 | End: 2018-04-16

## 2018-04-13 RX ORDER — DEXTROSE, SODIUM CHLORIDE, SODIUM LACTATE, POTASSIUM CHLORIDE, AND CALCIUM CHLORIDE 5; .6; .31; .03; .02 G/100ML; G/100ML; G/100ML; G/100ML; G/100ML
INJECTION, SOLUTION INTRAVENOUS AS NEEDED
Status: DISCONTINUED | OUTPATIENT
Start: 2018-04-13 | End: 2018-04-13 | Stop reason: HOSPADM

## 2018-04-13 RX ORDER — HYDROMORPHONE HYDROCHLORIDE 1 MG/ML
0.4 INJECTION, SOLUTION INTRAMUSCULAR; INTRAVENOUS; SUBCUTANEOUS EVERY 2 HOUR PRN
Status: ACTIVE | OUTPATIENT
Start: 2018-04-13 | End: 2018-04-14

## 2018-04-13 RX ORDER — KETOROLAC TROMETHAMINE 30 MG/ML
30 INJECTION, SOLUTION INTRAMUSCULAR; INTRAVENOUS EVERY 6 HOURS PRN
Status: DISPENSED | OUTPATIENT
Start: 2018-04-13 | End: 2018-04-14

## 2018-04-13 RX ORDER — TRISODIUM CITRATE DIHYDRATE AND CITRIC ACID MONOHYDRATE 500; 334 MG/5ML; MG/5ML
30 SOLUTION ORAL AS NEEDED
Status: COMPLETED | OUTPATIENT
Start: 2018-04-13 | End: 2018-04-13

## 2018-04-13 RX ORDER — DIPHENHYDRAMINE HYDROCHLORIDE 50 MG/ML
12.5 INJECTION INTRAMUSCULAR; INTRAVENOUS EVERY 4 HOURS PRN
Status: DISCONTINUED | OUTPATIENT
Start: 2018-04-13 | End: 2018-04-16

## 2018-04-13 RX ORDER — ZOLPIDEM TARTRATE 5 MG/1
5 TABLET ORAL NIGHTLY PRN
Status: DISCONTINUED | OUTPATIENT
Start: 2018-04-13 | End: 2018-04-16

## 2018-04-13 RX ORDER — TERBUTALINE SULFATE 1 MG/ML
0.25 INJECTION, SOLUTION SUBCUTANEOUS AS NEEDED
Status: DISCONTINUED | OUTPATIENT
Start: 2018-04-13 | End: 2018-04-13 | Stop reason: HOSPADM

## 2018-04-13 RX ORDER — SODIUM CHLORIDE, SODIUM LACTATE, POTASSIUM CHLORIDE, CALCIUM CHLORIDE 600; 310; 30; 20 MG/100ML; MG/100ML; MG/100ML; MG/100ML
INJECTION, SOLUTION INTRAVENOUS CONTINUOUS
Status: DISCONTINUED | OUTPATIENT
Start: 2018-04-13 | End: 2018-04-16

## 2018-04-13 RX ORDER — KETOROLAC TROMETHAMINE 30 MG/ML
30 INJECTION, SOLUTION INTRAMUSCULAR; INTRAVENOUS ONCE AS NEEDED
Status: DISCONTINUED | OUTPATIENT
Start: 2018-04-13 | End: 2018-04-13

## 2018-04-13 RX ORDER — LABETALOL HYDROCHLORIDE 5 MG/ML
80 INJECTION, SOLUTION INTRAVENOUS ONCE AS NEEDED
Status: DISCONTINUED | OUTPATIENT
Start: 2018-04-13 | End: 2018-04-13 | Stop reason: HOSPADM

## 2018-04-13 RX ORDER — CEFAZOLIN SODIUM/WATER 2 G/20 ML
2 SYRINGE (ML) INTRAVENOUS
Status: DISCONTINUED | OUTPATIENT
Start: 2018-04-13 | End: 2018-04-13 | Stop reason: HOSPADM

## 2018-04-13 RX ORDER — LABETALOL HYDROCHLORIDE 5 MG/ML
20 INJECTION, SOLUTION INTRAVENOUS ONCE AS NEEDED
Status: COMPLETED | OUTPATIENT
Start: 2018-04-13 | End: 2018-04-13

## 2018-04-13 RX ORDER — LABETALOL HYDROCHLORIDE 5 MG/ML
INJECTION, SOLUTION INTRAVENOUS
Status: DISPENSED
Start: 2018-04-13 | End: 2018-04-13

## 2018-04-13 RX ORDER — DEXTROSE, SODIUM CHLORIDE, SODIUM LACTATE, POTASSIUM CHLORIDE, AND CALCIUM CHLORIDE 5; .6; .31; .03; .02 G/100ML; G/100ML; G/100ML; G/100ML; G/100ML
INJECTION, SOLUTION INTRAVENOUS CONTINUOUS
Status: DISCONTINUED | OUTPATIENT
Start: 2018-04-14 | End: 2018-04-16

## 2018-04-13 RX ORDER — MORPHINE SULFATE 0.5 MG/ML
0.2 INJECTION, SOLUTION EPIDURAL; INTRATHECAL; INTRAVENOUS ONCE
Status: DISCONTINUED | OUTPATIENT
Start: 2018-04-13 | End: 2018-04-13 | Stop reason: HOSPADM

## 2018-04-13 RX ORDER — NALOXONE HYDROCHLORIDE 0.4 MG/ML
0.08 INJECTION, SOLUTION INTRAMUSCULAR; INTRAVENOUS; SUBCUTANEOUS
Status: ACTIVE | OUTPATIENT
Start: 2018-04-13 | End: 2018-04-14

## 2018-04-13 RX ORDER — DIPHENHYDRAMINE HCL 25 MG
25 CAPSULE ORAL EVERY 4 HOURS PRN
Status: DISCONTINUED | OUTPATIENT
Start: 2018-04-13 | End: 2018-04-16

## 2018-04-13 RX ORDER — DOCUSATE SODIUM 100 MG/1
100 CAPSULE, LIQUID FILLED ORAL
Status: DISCONTINUED | OUTPATIENT
Start: 2018-04-14 | End: 2018-04-16

## 2018-04-13 RX ORDER — IBUPROFEN 600 MG/1
600 TABLET ORAL ONCE AS NEEDED
Status: DISCONTINUED | OUTPATIENT
Start: 2018-04-13 | End: 2018-04-13 | Stop reason: HOSPADM

## 2018-04-13 NOTE — H&P
[facility] Patient Status:  Inpatient    1980 MRN [id number]   Location [facility] Attending Shell Thompson, Shante Parisi, [facility] Day # 0 PCP PHYSICIAN NONSTAFF     Date of Admission:   rub or gallop   Abdomen: FHT present   Fetal Surveillance:  145 BPM   Fetal heart variability: moderate      Cervix: no lesions or cervical motion tenderness, dilation 1, effacement 60 and station -4     Lab Review:  O, Rh+, Rubella-immune, Hepatitis B josé

## 2018-04-13 NOTE — PROGRESS NOTES
Pt is a 30year old female admitted to 106/106-A. Patient presents with:  Scheduled Induction     Pt is [id number] 37w0d intra-uterine pregnancy. History obtained, consents signed. Oriented to room, staff, and plan of care.

## 2018-04-13 NOTE — OPERATIVE REPORT
[facility]    Post-Operative Note    Whitley Pelaez Patient Status:  Inpatient    1980 MRN [id number]   Location [address] [facility] Attending Bhavya Liz, [facility] Day # 0 PCP PHYSICIAN NONSTAFF       Preoperative Diagnos in appearance. The uterine cavity was swept clean using a wet lap. .  .  The first layer of the hysterotomy was closed using 0- vycril. A second imbricating layer was placed with same suture. The incision was inspected for hemostasis.  Malcolm retractor

## 2018-04-13 NOTE — ANESTHESIA PREPROCEDURE EVALUATION
PRE-OP EVALUATION    Patient Name: Malena Cortez    Pre-op Diagnosis: * No pre-op diagnosis entered *    Procedure(s):      Surgeon(s) and Role:     * Liza Hopson DO - Young    Pre-op vitals reviewed.   Temp: 98.2 °F (36.8 °C)  Pulse: 85  Resp: preg                                                     Endo/Other               (+) anemia                   Pulmonary    Negative pulmonary ROS. Neuro/Psych    Negative neuro/psych ROS.                                 Past Surgical

## 2018-04-14 RX ORDER — MELATONIN
325
Status: DISCONTINUED | OUTPATIENT
Start: 2018-04-15 | End: 2018-04-16

## 2018-04-14 NOTE — PROGRESS NOTES
POD#1    Pt has no complaints, lochia min, passing gas   04/14/18  1400   BP: 125/80   Pulse: 91   Resp: 19   Temp: 97.9 °F (36.6 °C)     Recent Labs   Lab  04/13/18   0746  04/14/18   0626   RBC  3.86  3.05*   HGB  10.8*  8.4*   HCT  32.5*  25.7*   MCV  8

## 2018-04-14 NOTE — PROGRESS NOTES
04/14/18 0159   Provider Notification   Reason for Communication Evaluate  (BP's 140-150's/70-80's, asymptomatic)   Provider Name Hanh Lobo DO   Method of Communication Call   Response See orders  (Call for BP's >=160/100)   Notification Time 02

## 2018-04-14 NOTE — ANESTHESIA POSTPROCEDURE EVALUATION
Luis Carrillo Patient Status:  Inpatient   Age/Gender 30year old female MRN [id number]   Location [facility] Attending Shell 38, Gretel Kovacs, [address] [facility] Day # 0 PCP PHYSICIAN NONSTAFF       Anesthesia Post-op Note

## 2018-04-14 NOTE — PROGRESS NOTES
[facility]    Patients Name: Felecia Dears  Attending Physician: Jean Marie Adhikari DO  CSN: [id number]    Location:  2213/2213-A  MRN: [id number]    YOB: 1980  Admission Date: 4/13/2018     Obstetric Anesthesia Pain Progress Note    P

## 2018-04-14 NOTE — PLAN OF CARE
POSTPARTUM    • Long Term Goal:Experiences normal postpartum course Progressing    • Optimize infant feeding at the breast Progressing    • Appropriate maternal -  bonding Progressing      BP's 130-15'2/70-80's, asymptomatic. Continuing to monitor.

## 2018-04-14 NOTE — PROGRESS NOTES
NURSING ADMISSION NOTE      Patient admitted via Cart with baby in arms. [facility] verified, [employer] security bracelets in place. Oriented to room. Safety precautions initiated. Bed in low position.   Call light

## 2018-04-14 NOTE — PROGRESS NOTES
pericare done, pt transferred per cart with baby in arms to room 2213. Report given to Sloan Jimenez. Proper ID done with two RNs.

## 2018-04-15 NOTE — PROGRESS NOTES
POD#2    Pt has no complaints, lochia min   04/15/18  0828   BP: 138/79   Pulse: 98   Resp: 16   Temp: 99.1 °F (37.3 °C)     Recent Labs   Lab  04/13/18   0746  04/14/18   0626   RBC  3.86  3.05*   HGB  10.8*  8.4*   HCT  32.5*  25.7*   MCV  84.2  84.3   M

## 2018-04-15 NOTE — PLAN OF CARE
POSTPARTUM    • Optimize infant feeding at the breast Not Progressing    • Establishment of adequate milk supply with medication/procedure interruptions Not Progressing      Chooses to formula feed infant tonight.  Encouraged to pump 8 times/day to laurie

## 2018-04-16 VITALS
OXYGEN SATURATION: 99 % | HEART RATE: 95 BPM | BODY MASS INDEX: 35.68 KG/M2 | DIASTOLIC BLOOD PRESSURE: 79 MMHG | SYSTOLIC BLOOD PRESSURE: 147 MMHG | HEIGHT: 61 IN | RESPIRATION RATE: 16 BRPM | TEMPERATURE: 99 F | WEIGHT: 189 LBS

## 2018-04-16 RX ORDER — METOPROLOL SUCCINATE 25 MG/1
25 TABLET, EXTENDED RELEASE ORAL
Qty: 90 TABLET | Refills: 0 | Status: SHIPPED | OUTPATIENT
Start: 2018-04-17 | End: 2018-04-23

## 2018-04-16 RX ORDER — IBUPROFEN 600 MG/1
600 TABLET ORAL EVERY 6 HOURS SCHEDULED
Qty: 60 TABLET | Refills: 0 | Status: SHIPPED | OUTPATIENT
Start: 2018-04-16 | End: 2021-10-14

## 2018-04-16 RX ORDER — MELATONIN
325
Status: DISCONTINUED | OUTPATIENT
Start: 2018-04-16 | End: 2018-04-16

## 2018-04-16 RX ORDER — HYDROCODONE BITARTRATE AND ACETAMINOPHEN 5; 325 MG/1; MG/1
1 TABLET ORAL EVERY 4 HOURS PRN
Qty: 20 TABLET | Refills: 0 | Status: SHIPPED | OUTPATIENT
Start: 2018-04-16 | End: 2021-10-14

## 2018-04-16 NOTE — CM/SW NOTE
CM met with pt to review insurance and PCP for pt's infant. Pt stated that she had already spoken to [address] and infant will be an add on to medicaid. PCP will Dr Luci Pollard and infant has an appointment on 4/17/18 at 1330.  Pt is going to make [facility]

## 2018-04-16 NOTE — PROGRESS NOTES
Pt is anemic, handout given and discussed for foods high in iron. Pt has resources for [address] and called and scheduled an appointment.   Pharmacy tech brought Rx for Norco as requested by pt  Discussed care of incision and warning s/s to call MD.  Reviewed Judy Arechiga

## 2018-04-16 NOTE — PLAN OF CARE
POSTPARTUM    • Long Term Goal:Experiences normal postpartum course Adequate for Discharge    • Appropriate maternal -  bonding Adequate for Discharge          POSTPARTUM    • Optimize infant feeding at the breast Not Progressing    • Establishment

## 2018-04-18 NOTE — DISCHARGE SUMMARY
[address] [place]    PATIENT'S NAME: Mao Adhikari   ATTENDING PHYSICIAN: Kylah Mcgee D.O.   PATIENT ACCOUNT#:   [id number]    LOCATION:  [address] [place] [address]  MEDICAL RECORD #:   [id number]       YOB: 1980  ADMISSION DATE:       04/13/

## 2018-04-19 ENCOUNTER — TELEPHONE (OUTPATIENT)
Dept: OBGYN UNIT | Facility: HOSPITAL | Age: 38
End: 2018-04-19

## 2018-04-20 ENCOUNTER — HOSPITAL ENCOUNTER (INPATIENT)
Facility: HOSPITAL | Age: 38
LOS: 2 days | Discharge: HOME | DRG: 776 | End: 2018-04-23
Attending: OBSTETRICS & GYNECOLOGY | Admitting: OBSTETRICS & GYNECOLOGY
Payer: MEDICAID

## 2018-04-20 ENCOUNTER — TELEPHONE (OUTPATIENT)
Dept: OBGYN CLINIC | Facility: CLINIC | Age: 38
End: 2018-04-20

## 2018-04-20 ENCOUNTER — APPOINTMENT (OUTPATIENT)
Dept: CV DIAGNOSTICS | Facility: HOSPITAL | Age: 38
DRG: 776 | End: 2018-04-20
Attending: OBSTETRICS & GYNECOLOGY
Payer: MEDICAID

## 2018-04-20 PROCEDURE — 93306 TTE W/DOPPLER COMPLETE: CPT | Performed by: OBSTETRICS & GYNECOLOGY

## 2018-04-20 RX ORDER — CARVEDILOL 3.12 MG/1
6.25 TABLET ORAL 2 TIMES DAILY WITH MEALS
Status: DISCONTINUED | OUTPATIENT
Start: 2018-04-20 | End: 2018-04-22

## 2018-04-20 RX ORDER — LISINOPRIL 10 MG/1
10 TABLET ORAL 2 TIMES DAILY
Status: DISCONTINUED | OUTPATIENT
Start: 2018-04-20 | End: 2018-04-21

## 2018-04-20 RX ORDER — HYDRALAZINE HYDROCHLORIDE 20 MG/ML
10 INJECTION INTRAMUSCULAR; INTRAVENOUS ONCE AS NEEDED
Status: COMPLETED | OUTPATIENT
Start: 2018-04-20 | End: 2018-04-20

## 2018-04-20 RX ORDER — ARIPIPRAZOLE 15 MG/1
40 TABLET ORAL ONCE
Status: COMPLETED | OUTPATIENT
Start: 2018-04-20 | End: 2018-04-20

## 2018-04-20 RX ORDER — LABETALOL HYDROCHLORIDE 5 MG/ML
10 INJECTION, SOLUTION INTRAVENOUS EVERY 4 HOURS PRN
Status: DISCONTINUED | OUTPATIENT
Start: 2018-04-20 | End: 2018-04-22

## 2018-04-20 RX ORDER — LABETALOL HYDROCHLORIDE 5 MG/ML
INJECTION, SOLUTION INTRAVENOUS
Status: COMPLETED
Start: 2018-04-20 | End: 2018-04-20

## 2018-04-20 RX ORDER — LABETALOL HYDROCHLORIDE 5 MG/ML
80 INJECTION, SOLUTION INTRAVENOUS ONCE AS NEEDED
Status: COMPLETED | OUTPATIENT
Start: 2018-04-20 | End: 2018-04-20

## 2018-04-20 RX ORDER — LABETALOL HYDROCHLORIDE 5 MG/ML
20 INJECTION, SOLUTION INTRAVENOUS ONCE AS NEEDED
Status: COMPLETED | OUTPATIENT
Start: 2018-04-20 | End: 2018-04-20

## 2018-04-20 RX ORDER — OXYCODONE HYDROCHLORIDE 5 MG/1
5 TABLET ORAL EVERY 6 HOURS PRN
Status: DISCONTINUED | OUTPATIENT
Start: 2018-04-20 | End: 2018-04-23

## 2018-04-20 RX ORDER — POTASSIUM CHLORIDE 20 MEQ/1
40 TABLET, EXTENDED RELEASE ORAL EVERY 4 HOURS
Status: DISPENSED | OUTPATIENT
Start: 2018-04-20 | End: 2018-04-21

## 2018-04-20 RX ORDER — LABETALOL HYDROCHLORIDE 5 MG/ML
40 INJECTION, SOLUTION INTRAVENOUS ONCE AS NEEDED
Status: COMPLETED | OUTPATIENT
Start: 2018-04-20 | End: 2018-04-20

## 2018-04-20 RX ORDER — FUROSEMIDE 10 MG/ML
INJECTION INTRAMUSCULAR; INTRAVENOUS
Status: COMPLETED
Start: 2018-04-20 | End: 2018-04-20

## 2018-04-20 RX ORDER — SODIUM CHLORIDE, SODIUM LACTATE, POTASSIUM CHLORIDE, CALCIUM CHLORIDE 600; 310; 30; 20 MG/100ML; MG/100ML; MG/100ML; MG/100ML
INJECTION, SOLUTION INTRAVENOUS CONTINUOUS
Status: DISCONTINUED | OUTPATIENT
Start: 2018-04-20 | End: 2018-04-23

## 2018-04-20 RX ORDER — HYDROCODONE BITARTRATE AND ACETAMINOPHEN 5; 325 MG/1; MG/1
1 TABLET ORAL EVERY 6 HOURS PRN
Status: DISCONTINUED | OUTPATIENT
Start: 2018-04-20 | End: 2018-04-20

## 2018-04-20 RX ORDER — FUROSEMIDE 10 MG/ML
20 INJECTION INTRAMUSCULAR; INTRAVENOUS ONCE
Status: COMPLETED | OUTPATIENT
Start: 2018-04-20 | End: 2018-04-20

## 2018-04-20 NOTE — PROGRESS NOTES
DELIVERED A VIABLE BABY BOY ONE WEEK AGO. PT. PRESENTS TO LABOR AND DELIVERY FOR A PIH EVALUATION. PT. REPORTS HAVING A HEADACHE FOR THE PAST TWO DAYS. PT. DENIES EPIGASTRIC PAIN. PT ALSO REPORTS LOWER LEG SWELLING OVER THE PAST TWO DAYS.  PT. DENIES

## 2018-04-20 NOTE — TELEPHONE ENCOUNTER
[facility], had BP of 197/109, then after 15 min 184/112. Was given 25mg Toprol . Asking what to do w/her. In notes I read she was supposed to have cardiologist today.  Pt states she had to reschedule it because they want an echo first. negative

## 2018-04-20 NOTE — CONSULTS
[facility]    Maternal-Fetal Medicine Inpatient Consultation    Date of Admission:  4/20/2018  Date of Consult:  4/20/2018    Reason for Consult:   Critical Hypertension    History of Present Illness:  Whitley Pelaez is a a(n) 30year old female G5 Medications:    Current Facility-Administered Medications:   •  MAGNESIUM SULFATE BOLUS FROM BAG 4 g infusion, 4 g, Intravenous, Once **FOLLOWED BY** magnesium sulfate 40mg/ml infusion, 2 g/hr, Intravenous, Continuous  •  [COMPLETED] Labetalol HCl (T pathogenesis of preeclampsia is incompletely understood, but the disorder is clearly initiated by the presence of the trophoblast, and impaired placental angiogenesis plays an important role.    The clinical manifestations of preeclampsia can appear anytime predicting those at high risk of developing preeclampsia. Antiphospholipid antibody syndrome is associated with development of severe early preeclampsia and screening is recommended.   Measurement of angiogenic factors (VEGF, PlGF, sFlt-1, Adryan) in blood o pregnancy (does not apply to abortions).  These relationships were illustrated by a series of 80 women with severe second-trimester preeclampsia followed for five years: 72 percent developed recurrent preeclampsia and 35 percent were normotensive in their diastolic elevations greater than the 110's. Either labetalol or hydralazine may be utilized as first line agents.   For labetalol use as follows:  Notify physician if systolic blood pressure (BP) measurement is greater than or equal to 160 mm Hg or if cee intravenously [IV] over 2 minutes). Repeat BP measurement in 20 minutes and record results. If either BP threshold is still exceeded, administer hydralazine (10 mg IV over 2 minutes). If BP is below threshold, continue to monitor BP closely.   Repeat BP m

## 2018-04-20 NOTE — CONSULTS
Lei Colon [address] [facility] [address] Note [facility]  Report of Consultation    Malena Diego Patient Status:  Observation    1980 MRN [id number]   [facility] 4SW-A Attending Sary Plaza, redness. Ears, nose, mouth, throat, and face: Negative for hearing loss, tinnitus, nasal congestion, snoring, sore throat, hoarseness and voice change.   Respiratory: Negative for cough, sputum, hemoptysis, chest pain, wheezing, dyspnea on exertion, or str - 86 - -   04/20/18 1220 (!) 175/96 - - 98 - -   04/20/18 1205 (!) 182/101 - - 95 - -   04/20/18 1155 (!) 187/112 98 °F (36.7 °C) Oral 98 - 5' 1\" (1.549 m)         Intake/Output:    Intake/Output Summary (Last 24 hours) at 04/20/18 9038  Last data filed a 04/20/18.   Recent Labs   Lab  04/20/18   1203   SPECGRAVITY  1.015   GLUUR  Negative       Radiology:   No recent imaging            ASSESSMENT    · Preeclampsia  · Hypertensive urgency  · Peripartum cardiomyopathy  · anemia    PLAN    · Continue magnesium

## 2018-04-20 NOTE — CONSULTS
[facility] Cardiology Consultation    Felecia Dears Patient Status:  Observation    1980 MRN [id number]   Location 18[address] [facility] Attending Camron Valladares MD   [facility] Day # 0 PCP PHYSICIAN NONSTAFF     Reason for Consultation:  Hyperten distress  HEENT: No focal deficits. Neck: supple. JVP normal  Cardiac: Regular rhythm, S1, S2 normal,  rub or gallop. No murmur. Lungs: CTA  Abdomen: Soft, non-tender. Extremities: 1+ edema  Neurologic: no focal deficits  Skin: Warm and dry.

## 2018-04-21 PROBLEM — I16.1 HYPERTENSIVE EMERGENCY: Status: ACTIVE | Noted: 2018-04-21

## 2018-04-21 PROCEDURE — 99233 SBSQ HOSP IP/OBS HIGH 50: CPT | Performed by: OBSTETRICS & GYNECOLOGY

## 2018-04-21 RX ORDER — FUROSEMIDE 10 MG/ML
20 INJECTION INTRAMUSCULAR; INTRAVENOUS ONCE
Status: COMPLETED | OUTPATIENT
Start: 2018-04-21 | End: 2018-04-21

## 2018-04-21 RX ORDER — POTASSIUM CHLORIDE 20 MEQ/1
40 TABLET, EXTENDED RELEASE ORAL ONCE
Status: DISCONTINUED | OUTPATIENT
Start: 2018-04-21 | End: 2018-04-22

## 2018-04-21 RX ORDER — LISINOPRIL 20 MG/1
20 TABLET ORAL 2 TIMES DAILY
Status: DISCONTINUED | OUTPATIENT
Start: 2018-04-21 | End: 2018-04-23

## 2018-04-21 RX ORDER — ARIPIPRAZOLE 15 MG/1
40 TABLET ORAL ONCE
Status: COMPLETED | OUTPATIENT
Start: 2018-04-21 | End: 2018-04-21

## 2018-04-21 NOTE — PROGRESS NOTES
Patient complaining of HA and post  incisional pains. Discussed with [facility] on-call. No active bleeding, platelets and renal function normal, but anemia present with Hgb 8.6 today--so will withhold NSAIDs for pain.   Given preeclampsia and concern f

## 2018-04-21 NOTE — PLAN OF CARE
Received pt resting in bed. Denies visual disturbances. Breathing easily on room air, RR normal.  DTR's checked q2 hours. Pt c/o of mild intermittent headache. Mag gtt infusing. Stopped at 14:45 per OB/GYN. Serum mag checked Q6 hours.   PRN Labetalol giv

## 2018-04-21 NOTE — PROGRESS NOTES
[facility] [address] [place] [address] Cardiology Progress Note        Nohemy Murray Patient Status:  Observation    1980 MRN [id number]   [facility] 4SW-A Attending Vicki Shin MD   Hosp Day # 0 PCP PHYSICIAN NONSTAFF     Subjective: AST  25   ALB  2.9*       No results for input(s): TROP, CK in the last 72 hours. No results for input(s): PTP, INR in the last 72 hours. Impression:  1. Hypertensive urgency, one week post partum. Preeclampsia.   2. H/o peripartum cardio

## 2018-04-21 NOTE — PLAN OF CARE
CARDIOVASCULAR - ADULT    • Maintains optimal cardiac output and hemodynamic stability Progressing        Patient/Family Goals    • Patient/Family Long Term Goal Progressing        SKIN/TISSUE INTEGRITY - ADULT    • Incision(s), wounds(s) or drain site(s)

## 2018-04-21 NOTE — PROGRESS NOTES
[facility] Pulmonary/Critical Care Progress Note     SUBJECTIVE/24H Events: All events, procedures, notes reviewed. No acute events overnight. She c/o headache overnight that resolved spontaneously.   Received oxycodone 105  113   CA  8.9  7.5*   NA  137  136   K  3.5*  4.0   CL  106  106   CO2  21.0*  20.0*     Recent Labs   Lab  04/20/18   1153  04/21/18   0559   RBC  3.11*  3.25*   HGB  8.6*  8.9*   HCT  26.4*  27.7*   MCV  84.9  85.2   MCH  27.7  27.4   MCHC  32.6  32

## 2018-04-21 NOTE — PLAN OF CARE
CARDIOVASCULAR - ADULT    • Maintains optimal cardiac output and hemodynamic stability Progressing          NEUROLOGICAL - ADULT    • Absence of seizures Progressing    • Remains free of injury related to seizure activity Progressing          Pt received o

## 2018-04-22 ENCOUNTER — APPOINTMENT (OUTPATIENT)
Dept: GENERAL RADIOLOGY | Facility: HOSPITAL | Age: 38
DRG: 776 | End: 2018-04-22
Attending: OBSTETRICS & GYNECOLOGY
Payer: MEDICAID

## 2018-04-22 PROCEDURE — 99255 IP/OBS CONSLTJ NEW/EST HI 80: CPT | Performed by: HOSPITALIST

## 2018-04-22 PROCEDURE — 99231 SBSQ HOSP IP/OBS SF/LOW 25: CPT | Performed by: OBSTETRICS & GYNECOLOGY

## 2018-04-22 PROCEDURE — 71045 X-RAY EXAM CHEST 1 VIEW: CPT | Performed by: NURSE PRACTITIONER

## 2018-04-22 RX ORDER — POTASSIUM CHLORIDE 20 MEQ/1
40 TABLET, EXTENDED RELEASE ORAL ONCE
Status: DISCONTINUED | OUTPATIENT
Start: 2018-04-22 | End: 2018-04-22

## 2018-04-22 RX ORDER — CARVEDILOL 12.5 MG/1
12.5 TABLET ORAL 2 TIMES DAILY WITH MEALS
Status: DISCONTINUED | OUTPATIENT
Start: 2018-04-22 | End: 2018-04-23

## 2018-04-22 RX ORDER — FUROSEMIDE 10 MG/ML
40 INJECTION INTRAMUSCULAR; INTRAVENOUS ONCE
Status: COMPLETED | OUTPATIENT
Start: 2018-04-22 | End: 2018-04-22

## 2018-04-22 RX ORDER — HYDRALAZINE HYDROCHLORIDE 20 MG/ML
10 INJECTION INTRAMUSCULAR; INTRAVENOUS ONCE
Status: COMPLETED | OUTPATIENT
Start: 2018-04-22 | End: 2018-04-22

## 2018-04-22 RX ORDER — ARIPIPRAZOLE 15 MG/1
40 TABLET ORAL ONCE
Status: COMPLETED | OUTPATIENT
Start: 2018-04-22 | End: 2018-04-22

## 2018-04-22 RX ORDER — HYDRALAZINE HYDROCHLORIDE 20 MG/ML
INJECTION INTRAMUSCULAR; INTRAVENOUS
Status: COMPLETED
Start: 2018-04-22 | End: 2018-04-22

## 2018-04-22 RX ORDER — HYDRALAZINE HYDROCHLORIDE 50 MG/1
50 TABLET, FILM COATED ORAL EVERY 8 HOURS SCHEDULED
Status: DISCONTINUED | OUTPATIENT
Start: 2018-04-22 | End: 2018-04-23

## 2018-04-22 RX ORDER — SPIRONOLACTONE 25 MG/1
25 TABLET ORAL DAILY
Status: DISCONTINUED | OUTPATIENT
Start: 2018-04-22 | End: 2018-04-23

## 2018-04-22 RX ORDER — LABETALOL HYDROCHLORIDE 5 MG/ML
20 INJECTION, SOLUTION INTRAVENOUS EVERY 4 HOURS PRN
Status: DISCONTINUED | OUTPATIENT
Start: 2018-04-22 | End: 2018-04-23

## 2018-04-22 RX ORDER — BUTALBITAL, ACETAMINOPHEN AND CAFFEINE 50; 325; 40 MG/1; MG/1; MG/1
1 TABLET ORAL EVERY 4 HOURS PRN
Status: DISCONTINUED | OUTPATIENT
Start: 2018-04-22 | End: 2018-04-23

## 2018-04-22 NOTE — CONSULTS
[facility]  CONSULT     Melvin Kraus Patient Status:  Inpatient    1980 MRN [id number]   [facility] 4SW-A Attending Rocco Ocampo MD   Hosp Day # 1 PCP PHYSICIAN NONSTAFF     Reason for consult: Headache    Requested by HPI.    Physical Exam:    /80 (BP Location: Right arm)   Pulse 96   Temp 98.3 °F (36.8 °C) (Temporal)   Resp 28   Ht 154.9 cm (5' 1\")   Wt 175 lb 4.3 oz (79.5 kg)   LMP 07/25/2017 (Approximate)   SpO2 97%   BMI 33.12 kg/m²   General: No acute distre being adjusted  3. Accelerated HTN:  Improved with bp meds      Quality:  · DVT Prophylaxis: SCD's  · CODE status: Full Code  · Craft: None    Plan of care discussed with Patient.     Mike Borrego MD  6/95/1605    **Certification      PHYSICIAN Certificati

## 2018-04-22 NOTE — PROGRESS NOTES
Temp: 98.3 °F (36.8 °C)  Pulse: 95  Resp: 29  BP: 131/79 no c/o   Denies SOB or chest apin. Kellie Templeton BTL discussed. Transfer to floor today. abd-+ BM,  Pain controlled. incsion intact. . Fundus firm and belos umbilicus. . Will sign off and follow outpt

## 2018-04-22 NOTE — PROGRESS NOTES
[facility] Pulmonary/Critical Care Progress Note     SUBJECTIVE/24H Events: All events, procedures, notes reviewed. No acute events overnight.  Had another headache overnight, also noted overnight to have desat and tachy 4.0  4.1   CL  106  106  109   CO2  21.0*  20.0*  21.0*     Recent Labs   Lab  04/20/18   1153  04/21/18   0559  04/22/18   0444   RBC  3.11*  3.25*  3.86   HGB  8.6*  8.9*  10.7*   HCT  26.4*  27.7*  32.7*   MCV  84.9  85.2  84.7   MCH  27.7  27.4  27.7 [facility]  04/22/18

## 2018-04-22 NOTE — PROGRESS NOTES
[facility] Cardiology Progress Note        Viri Coyle Patient Status:  Observation    1980 MRN [id number]   [facility] 4SW-A Attending Tootie Peoples MD   Hosp Day # 1 PCP PHYSICIAN NONSTAFF     Subjective: --    --   4.0   --    --   4.1   CL  106   --    --   106   --    --   109   CO2  21.0*   --    --   20.0*   --    --   21.0*   BUN  10   --    --   8   --    --   12   CREATSERUM  0.74   --    --   0.69   --    --   0.76   CA  8.9   --    --   7.5*   -

## 2018-04-22 NOTE — PLAN OF CARE
CARDIOVASCULAR - ADULT    • Maintains optimal cardiac output and hemodynamic stability Progressing        Patient/Family Goals    • Patient/Family Long Term Goal Progressing          Pt received on room air.  Denies pain  Incision c/d/I  Prn labetalol for s

## 2018-04-23 ENCOUNTER — TELEPHONE (OUTPATIENT)
Dept: OBGYN CLINIC | Facility: CLINIC | Age: 38
End: 2018-04-23

## 2018-04-23 VITALS
DIASTOLIC BLOOD PRESSURE: 78 MMHG | HEIGHT: 61 IN | HEART RATE: 98 BPM | WEIGHT: 175.25 LBS | OXYGEN SATURATION: 99 % | RESPIRATION RATE: 18 BRPM | BODY MASS INDEX: 33.09 KG/M2 | TEMPERATURE: 98 F | SYSTOLIC BLOOD PRESSURE: 137 MMHG

## 2018-04-23 RX ORDER — FUROSEMIDE 40 MG/1
40 TABLET ORAL DAILY
Status: DISCONTINUED | OUTPATIENT
Start: 2018-04-23 | End: 2018-04-23

## 2018-04-23 NOTE — TELEPHONE ENCOUNTER
Received fax from health dept advising of low hemoglobin drawn on day 4/20/18. Pt was sent to hospital on this day for PP hypertension. Was admitted.

## 2018-04-23 NOTE — PLAN OF CARE
CARDIOVASCULAR - ADULT    • Maintains optimal cardiac output and hemodynamic stability Progressing        NEUROLOGICAL - ADULT    • Achieves stable or improved neurological status Progressing    • Absence of seizures Progressing    • Remains free of injury

## 2018-04-24 NOTE — PAYOR COMM NOTE
--------------  ADMISSION REVIEW     Payor: [address] [place] [address] #:  [id number]  Authorization Number: [id number]    Admit date: 4/21/18  Admit time: 0       Admitting Physician: Sandy Llamas MD  Attending Physician:  No att. providers    IMPRESSION:   · POD 7  · Chronic hypertension with superimposed severe preeclampsia  · AMA: low-risk cell free fetal DNA, declined invasive testing  · History of Peripartum Cardiomyopathy: echocardiogram revealed NORMAL EF earlier in pregnancy (55-60%) Stop toprol and change to coreg 6.25 mg BID. Continue prn labetalol.   Begin lisinopril 10 mg BID       Pulmonary/Critical Care Consult Note     Reason for Consultation:  Intensive care management    She was admitted to ICU for hemodynamic monitoring      IV lasix once. Replace potassium  BMP in am.  Await OB. Stable for CTU from my standpoint.       OB/GYN Progress Note    WBC 8.8 04/21/2018   HGB 8.9 04/21/2018   HCT 27.7 04/21/2018   .0 04/21/2018   CREATSERUM 0.69 04/21/2018   BUN 8 04/21/2018 PLT  556.0*        Xr Chest Ap Portable  (cpt=71045)     Result Date: 4/22/2018  CONCLUSION:  Mild cardiomegaly, likely mild vascular congestion and volume overload.          ASSESSMENT     · Preeclampsia  · Hypertensive urgency  · Peripartum cardiomyopathy

## 2018-04-30 ENCOUNTER — TELEPHONE (OUTPATIENT)
Dept: OBGYN CLINIC | Facility: CLINIC | Age: 38
End: 2018-04-30

## 2018-05-11 PROBLEM — R94.30 CARDIAC LV EJECTION FRACTION 30-35%: Status: ACTIVE | Noted: 2018-05-11

## 2018-05-11 PROBLEM — I16.1 HYPERTENSIVE EMERGENCY: Status: RESOLVED | Noted: 2018-04-21 | Resolved: 2018-05-11

## 2018-05-11 PROBLEM — I50.21 ACUTE SYSTOLIC CHF (CONGESTIVE HEART FAILURE), NYHA CLASS 2 (HCC): Status: ACTIVE | Noted: 2018-05-11

## 2018-05-11 PROBLEM — R93.1 CARDIAC LV EJECTION FRACTION 30-35%: Status: ACTIVE | Noted: 2018-05-11

## 2018-05-25 ENCOUNTER — MED REC SCAN ONLY (OUTPATIENT)
Dept: OBGYN CLINIC | Facility: CLINIC | Age: 38
End: 2018-05-25

## 2018-05-25 ENCOUNTER — OFFICE VISIT (OUTPATIENT)
Dept: OBGYN CLINIC | Facility: CLINIC | Age: 38
End: 2018-05-25

## 2018-05-25 VITALS
RESPIRATION RATE: 16 BRPM | HEIGHT: 61 IN | BODY MASS INDEX: 31.15 KG/M2 | HEART RATE: 72 BPM | SYSTOLIC BLOOD PRESSURE: 120 MMHG | WEIGHT: 165 LBS | DIASTOLIC BLOOD PRESSURE: 60 MMHG

## 2018-05-25 NOTE — PROGRESS NOTES
HPI Thena Gowers is a 30year old female [id number] here for 6 week post-partum visit. Patient delivered a  male infant via repeat CSx. Patient desires IUD or tubal for contraception. Patient is bottle feeding.    Patient denies symptoms of depressi total) by mouth daily. , Disp: 30 tablet, Rfl: 0  •  HYDROcodone-acetaminophen 5-325 MG Oral Tab, Take 1 tablet by mouth every 4 (four) hours as needed. , Disp: 20 tablet, Rfl: 0  •  ibuprofen 600 MG Oral Tab, Take 1 tablet (600 mg total) by mouth every 6 (s

## 2019-05-06 ENCOUNTER — APPOINTMENT (OUTPATIENT)
Dept: ULTRASOUND IMAGING | Facility: HOSPITAL | Age: 39
End: 2019-05-06
Attending: EMERGENCY MEDICINE
Payer: MEDICAID

## 2019-05-06 ENCOUNTER — HOSPITAL ENCOUNTER (EMERGENCY)
Facility: HOSPITAL | Age: 39
Discharge: HOME | End: 2019-05-06
Attending: EMERGENCY MEDICINE
Payer: MEDICAID

## 2019-05-06 VITALS
BODY MASS INDEX: 32.66 KG/M2 | TEMPERATURE: 99 F | DIASTOLIC BLOOD PRESSURE: 123 MMHG | HEIGHT: 61 IN | WEIGHT: 173 LBS | OXYGEN SATURATION: 98 % | RESPIRATION RATE: 16 BRPM | SYSTOLIC BLOOD PRESSURE: 159 MMHG | HEART RATE: 88 BPM

## 2019-05-06 DIAGNOSIS — R10.9 ABDOMINAL PAIN OF UNKNOWN ETIOLOGY: Primary | ICD-10-CM

## 2019-05-06 DIAGNOSIS — K59.00 CONSTIPATION, UNSPECIFIED CONSTIPATION TYPE: ICD-10-CM

## 2019-05-06 PROCEDURE — 99285 EMERGENCY DEPT VISIT HI MDM: CPT

## 2019-05-06 PROCEDURE — 80053 COMPREHEN METABOLIC PANEL: CPT | Performed by: EMERGENCY MEDICINE

## 2019-05-06 PROCEDURE — 81001 URINALYSIS AUTO W/SCOPE: CPT | Performed by: EMERGENCY MEDICINE

## 2019-05-06 PROCEDURE — 36415 COLL VENOUS BLD VENIPUNCTURE: CPT

## 2019-05-06 PROCEDURE — 85025 COMPLETE CBC W/AUTO DIFF WBC: CPT | Performed by: EMERGENCY MEDICINE

## 2019-05-06 PROCEDURE — 76700 US EXAM ABDOM COMPLETE: CPT | Performed by: EMERGENCY MEDICINE

## 2019-05-06 PROCEDURE — 83690 ASSAY OF LIPASE: CPT | Performed by: EMERGENCY MEDICINE

## 2019-05-06 PROCEDURE — 99284 EMERGENCY DEPT VISIT MOD MDM: CPT

## 2019-05-06 PROCEDURE — 81025 URINE PREGNANCY TEST: CPT

## 2019-05-06 RX ORDER — DICYCLOMINE HCL 20 MG
20 TABLET ORAL 4 TIMES DAILY PRN
Qty: 30 TABLET | Refills: 0 | Status: SHIPPED | OUTPATIENT
Start: 2019-05-06 | End: 2019-06-05

## 2019-05-06 NOTE — ED PROVIDER NOTES
Us Abdomen Complete (cpt=76700)    Result Date: 5/6/2019  PROCEDURE:  US ABDOMEN COMPLETE (CPT=76700)  COMPARISON:  None. INDICATIONS:  abdominal pain, constipation  TECHNIQUE:  Real time gray-scale ultrasound was used to evaluate the abdomen.   The exam i

## 2019-05-06 NOTE — ED PROVIDER NOTES
Patient Seen in: [facility] Emergency Department    History   Patient presents with:  Abdomen/Flank Pain (GI/)  Constipation (gastrointestinal)    Stated Complaint: abdominal pain, constipation    HPI    Patient 45-year-old female who states for t Exam  GENERAL: Patient resting comfortably on the cart in no acute distress. HEENT: Extraocular muscles intact, pupils equal round reactive to light and accommodation. Mouth normal, neck supple, no meningismus.   LUNGS: Lungs clear to auscultation bilater physician to check ultrasound and final disposition.             Disposition and Plan     Clinical Impression:  Abdominal pain of unknown etiology  (primary encounter diagnosis)  Constipation, unspecified constipation type    Disposition:  Discharge  5/6/20

## 2021-11-30 NOTE — PROGRESS NOTES
Occupational Therapy Discharge Summary    Reason for therapy discharge:    Discharged to home.    Progress towards therapy goal(s). See goals on Care Plan in [facility] electronic health record for goal details.  Goals not met.  Barriers to achieving goals:   discharge from facility.    Therapy recommendation(s):    Continued therapy is recommended.  Rationale/Recommendations:  ARU was recommended, however pt discharged home.      **Pt not seen by discharging therapist on this date, note written based on previous treating therapist's notes and recommendations        Patient aware of results and recommendations

## 2024-05-14 ENCOUNTER — NURSE ONLY (OUTPATIENT)
Dept: INTERNAL MEDICINE CLINIC | Facility: HOSPITAL | Age: 44
End: 2024-05-14
Attending: EMERGENCY MEDICINE

## 2024-05-14 DIAGNOSIS — Z00.00 WELLNESS EXAMINATION: Primary | ICD-10-CM

## 2024-05-14 PROCEDURE — 86787 VARICELLA-ZOSTER ANTIBODY: CPT

## 2024-05-14 PROCEDURE — 86765 RUBEOLA ANTIBODY: CPT

## 2024-05-14 PROCEDURE — 86480 TB TEST CELL IMMUN MEASURE: CPT

## 2024-05-14 PROCEDURE — 86735 MUMPS ANTIBODY: CPT

## 2024-05-15 LAB
M TB IFN-G CD4+ T-CELLS BLD-ACNC: 0.07 IU/ML
M TB TUBERC IFN-G BLD QL: NEGATIVE
M TB TUBERC IGNF/MITOGEN IGNF CONTROL: >10 IU/ML
MEV IGG SER-ACNC: 55.8 AU/ML (ref 16.5–?)
MUV IGG SER IA-ACNC: 59.7 AU/ML (ref 11–?)
QFT TB1 AG MINUS NIL: 0.02 IU/ML
QFT TB2 AG MINUS NIL: 0.03 IU/ML
VZV IGG SER IA-ACNC: 1601 (ref 165–?)

## 2025-02-25 NOTE — PROGRESS NOTES
[facility]  Progress Note    Lesvia Jose Frandal Patient Status:  Inpatient    1980 MRN [id number]   [facility] 4SW-A Attending Nghia Jack MD   Hosp Day # 0 PCP PHYSICIAN NONSTAFF     Subjective:  Lesvia Kohlerrandal is a(n) 37 ye PT: Orders received. Chart reviewed and discussed with care team.  PT not indicated as per chart review pt mobilizing close to her baseline, planning to return to senior living today.  Defer discharge recommendations to care team in general, however patient may benefit from home PT safety eval.  Will complete  PT orders.

## 2025-08-12 ENCOUNTER — OFFICE VISIT (OUTPATIENT)
Facility: LOCATION | Age: 45
End: 2025-08-12

## 2025-08-12 DIAGNOSIS — H69.91 DYSFUNCTION OF RIGHT EUSTACHIAN TUBE: Primary | ICD-10-CM

## 2025-08-12 DIAGNOSIS — H93.291 ABNORMAL AUDITORY PERCEPTION OF RIGHT EAR: Primary | ICD-10-CM

## 2025-08-12 PROCEDURE — 92504 EAR MICROSCOPY EXAMINATION: CPT | Performed by: STUDENT IN AN ORGANIZED HEALTH CARE EDUCATION/TRAINING PROGRAM

## 2025-08-12 PROCEDURE — 92567 TYMPANOMETRY: CPT | Performed by: AUDIOLOGIST

## 2025-08-12 PROCEDURE — 99203 OFFICE O/P NEW LOW 30 MIN: CPT | Performed by: STUDENT IN AN ORGANIZED HEALTH CARE EDUCATION/TRAINING PROGRAM

## 2025-08-12 RX ORDER — OXYMETAZOLINE HYDROCHLORIDE 0.05 G/100ML
2 SPRAY NASAL 2 TIMES DAILY
Qty: 30 ML | Refills: 0 | Status: SHIPPED | OUTPATIENT
Start: 2025-08-12 | End: 2025-08-15

## 2025-08-12 RX ORDER — FLUTICASONE PROPIONATE 50 MCG
1 SPRAY, SUSPENSION (ML) NASAL DAILY
Qty: 16 G | Refills: 3 | Status: SHIPPED | OUTPATIENT
Start: 2025-08-12

## (undated) DEVICE — TRAXI RETRACTOR

## (undated) NOTE — LETTER
October 27, 2017    Patient: Melvin Kraus   Date of Visit: 10/27/2017       To Whom It May Concern:    Ana Tillman was seen and treated in our emergency department on 10/27/2017. She. She should be excused from work today.     If you have any

## (undated) NOTE — ED AVS SNAPSHOT
Jessica Rehman   MRN: [id number]    Department:  [facility] Emergency Department   Date of Visit:  10/27/2017           Disclosure     Insurance plans vary and the physician(s) referred by the ER may not be covered by your plan.  Please contact If you have been prescribed any medication(s), please fill your prescription right away and begin taking the medication(s) as directed    If the emergency physician has read X-rays, these will be re-interpreted by a radiologist.  If there is a significant

## (undated) NOTE — LETTER
[facility]  Leandro Israel 63 [id number] [facility], [address] [place] [address]    Consent for Operation    Date: __________________    Time: _______________    1.  I authorize the performance upon Dragan Fuentes the following operation:                              Primary videotape. The [facility] will not be responsible for storage or maintenance of this tape. 6. For the purpose of advancing medical education, I consent to the admittance of observers to the Operating Room.     7. I authorize the use of any specimen, organs Signature of Patient:   ___________________________    When the patient is a minor or mentally incompetent to give consent:  Signature of person authorized to consent for patient: ___________________________   Relationship to patient: _____________________ · If I am allergic to anything or have had a reaction to anesthesia before. 3. I understand how the anesthesia medicine will help me (benefits). 4. I understand that with any type of anesthesia medicine there are risks:  a.  The most common risks are: their representative has agreed to have anesthesia services.     _____________________________________________________________________________  Witness        Date   Time  I have verified that the signature is that of the patient or patient’s representative

## (undated) NOTE — ED AVS SNAPSHOT
Whitley Pelaez   MRN: [id number]    Department:  [facility] Emergency Department   Date of Visit:  5/6/2019           Disclosure     Insurance plans vary and the physician(s) referred by the ER may not be covered by your plan.  Please contact yo tell this physician (or your personal doctor if your instructions are to return to your personal doctor) about any new or lasting problems. The primary care or specialist physician will see patients referred from the [facility] Emergency Department.  Ila Robles

## (undated) NOTE — LETTER
Patient Name: Viri Coyle  YOB: 1980          MRN number:  [id number]  Date:  12/28/2017  Referring Physician:  Fredy Gutierrez     Discharge Summary    Pt has attended 5, cancelled 0, and no shown 0 visits in Physical Therapy.        Dx contraction without requiring verbal or tactile cuing to promote advancement of therex (4 visits) -MET  · Pt will demonstrate good understanding of proper posture and body mechanics to decrease pain and improve spinal safety (8 visits) -MET  · Pt will repo

## (undated) NOTE — LETTER
[facility]  Leandro Israel 63 [phone] [facility], [address] [place] [address]    Consent for Operation    Date: __________________    Time: _______________    1.  I authorize the performance upon Saeed Jara the following operation:                              Primary procedure has been videotaped, the surgeon will obtain the original videotape. The hospital will not be responsible for storage or maintenance of this tape.     6. For the purpose of advancing medical education, I consent to the admittance of observers to t STATEMENTS REQUIRING INSERTION OR COMPLETION WERE FILLED IN.     Signature of Patient:   ___________________________    When the patient is a minor or mentally incompetent to give consent:  Signature of person authorized to consent for patient: ____________ these medicines may increase my risk of anesthetic complications. · If I am allergic to anything or have had a reaction to anesthesia before. 3. I understand how the anesthesia medicine will help me (benefits).     4. I understand that with any type of patient’s representative) and answered their questions. The patient or their representative has agreed to have anesthesia services.     _____________________________________________________________________________  Witness        Date   Time  I have isidro